# Patient Record
Sex: MALE | URBAN - METROPOLITAN AREA
[De-identification: names, ages, dates, MRNs, and addresses within clinical notes are randomized per-mention and may not be internally consistent; named-entity substitution may affect disease eponyms.]

---

## 2021-06-17 ENCOUNTER — APPOINTMENT (OUTPATIENT)
Dept: URBAN - METROPOLITAN AREA CLINIC 272 | Age: 26
Setting detail: DERMATOLOGY
End: 2021-06-18

## 2021-06-17 DIAGNOSIS — D49.2 NEOPLASM OF UNSPECIFIED BEHAVIOR OF BONE, SOFT TISSUE, AND SKIN: ICD-10-CM

## 2021-06-17 DIAGNOSIS — L81.4 OTHER MELANIN HYPERPIGMENTATION: ICD-10-CM

## 2021-06-17 DIAGNOSIS — L72.0 EPIDERMAL CYST: ICD-10-CM

## 2021-06-17 DIAGNOSIS — D22 MELANOCYTIC NEVI: ICD-10-CM

## 2021-06-17 PROBLEM — D22.4 MELANOCYTIC NEVI OF SCALP AND NECK: Status: ACTIVE | Noted: 2021-06-17

## 2021-06-17 PROCEDURE — OTHER BENIGN DESTRUCTION COSMETIC: OTHER

## 2021-06-17 PROCEDURE — 11103 TANGNTL BX SKIN EA SEP/ADDL: CPT

## 2021-06-17 PROCEDURE — 99202 OFFICE O/P NEW SF 15 MIN: CPT | Mod: 25

## 2021-06-17 PROCEDURE — OTHER BIOPSY BY SHAVE METHOD: OTHER

## 2021-06-17 PROCEDURE — OTHER REASSURANCE: OTHER

## 2021-06-17 PROCEDURE — OTHER COUNSELING: OTHER

## 2021-06-17 PROCEDURE — 11102 TANGNTL BX SKIN SINGLE LES: CPT

## 2021-06-17 ASSESSMENT — LOCATION DETAILED DESCRIPTION DERM
LOCATION DETAILED: RIGHT INFERIOR MEDIAL BUCCAL CHEEK
LOCATION DETAILED: RIGHT SUPERIOR OCCIPITAL SCALP
LOCATION DETAILED: LEFT CENTRAL MALAR CHEEK
LOCATION DETAILED: LEFT INFERIOR CENTRAL MALAR CHEEK
LOCATION DETAILED: RIGHT SUPERIOR MEDIAL BUCCAL CHEEK
LOCATION DETAILED: RIGHT CENTRAL MALAR CHEEK

## 2021-06-17 ASSESSMENT — LOCATION SIMPLE DESCRIPTION DERM
LOCATION SIMPLE: POSTERIOR SCALP
LOCATION SIMPLE: RIGHT CHEEK
LOCATION SIMPLE: LEFT CHEEK

## 2021-06-17 ASSESSMENT — LOCATION ZONE DERM
LOCATION ZONE: SCALP
LOCATION ZONE: FACE

## 2021-06-17 NOTE — PROCEDURE: BIOPSY BY SHAVE METHOD
Consent: Written consent was obtained and risks were reviewed including but not limited to scarring, infection, bleeding, scabbing, incomplete removal, nerve damage and allergy to anesthesia.
Hide Additional Size Dimension?: No
Anesthesia Type: 1% lidocaine without epinephrine
Cryotherapy Text: The wound bed was treated with cryotherapy after the biopsy was performed.
Biopsy Method: Personna blade
Depth Of Biopsy: dermis
Silver Nitrate Text: The wound bed was treated with silver nitrate after the biopsy was performed.
Notification Instructions: Patient will be notified of biopsy results. However, patient instructed to call the office if not contacted within 2 weeks.
Hemostasis: Electrocautery
Information: Selecting Yes will display possible errors in your note based on the variables you have selected. This validation is only offered as a suggestion for you. PLEASE NOTE THAT THE VALIDATION TEXT WILL BE REMOVED WHEN YOU FINALIZE YOUR NOTE. IF YOU WANT TO FAX A PRELIMINARY NOTE YOU WILL NEED TO TOGGLE THIS TO 'NO' IF YOU DO NOT WANT IT IN YOUR FAXED NOTE.
Post-Care Instructions: I reviewed with the patient in detail post-care instructions. Patient is to keep the biopsy site dry overnight, and then apply bacitracin twice daily until healed. Patient may apply hydrogen peroxide soaks to remove any crusting.
Electrodesiccation And Curettage Text: The wound bed was treated with electrodesiccation and curettage after the biopsy was performed.
Anesthesia Volume In Cc (Will Not Render If 0): 0.5
Size Of Lesion In Cm: 0.6
Dressing: bandage
Biopsy Type: H and E
Detail Level: Detailed
Curettage Text: The wound bed was treated with curettage after the biopsy was performed.
Was A Bandage Applied: Yes
Body Location Override (Optional - Billing Will Still Be Based On Selected Body Map Location If Applicable): right chin inferior
Body Location Override (Optional - Billing Will Still Be Based On Selected Body Map Location If Applicable): right chin superior
Billing Type: Third-Party Bill
Electrodesiccation Text: The wound bed was treated with electrodesiccation after the biopsy was performed.
Additional Anesthesia Volume In Cc (Will Not Render If 0): 0
Type Of Destruction Used: Curettage
Wound Care: Petrolatum
Size Of Lesion In Cm: 0.8

## 2024-04-18 ENCOUNTER — LAB (OUTPATIENT)
Dept: LAB | Facility: HOSPITAL | Age: 29
End: 2024-04-18
Payer: MEDICAID

## 2024-04-18 ENCOUNTER — OFFICE VISIT (OUTPATIENT)
Dept: FAMILY MEDICINE CLINIC | Facility: CLINIC | Age: 29
End: 2024-04-18
Payer: MEDICAID

## 2024-04-18 VITALS
SYSTOLIC BLOOD PRESSURE: 116 MMHG | DIASTOLIC BLOOD PRESSURE: 70 MMHG | HEART RATE: 89 BPM | OXYGEN SATURATION: 97 % | WEIGHT: 155.2 LBS

## 2024-04-18 DIAGNOSIS — Z87.19 HISTORY OF PANCREATITIS: ICD-10-CM

## 2024-04-18 DIAGNOSIS — F19.11 HISTORY OF SUBSTANCE ABUSE: ICD-10-CM

## 2024-04-18 DIAGNOSIS — Z00.00 HEALTHCARE MAINTENANCE: ICD-10-CM

## 2024-04-18 DIAGNOSIS — K21.9 GASTROESOPHAGEAL REFLUX DISEASE, UNSPECIFIED WHETHER ESOPHAGITIS PRESENT: Primary | ICD-10-CM

## 2024-04-18 DIAGNOSIS — F17.200 TOBACCO DEPENDENCE: ICD-10-CM

## 2024-04-18 DIAGNOSIS — R74.8 ELEVATED LIVER ENZYMES: ICD-10-CM

## 2024-04-18 DIAGNOSIS — R10.10 UPPER ABDOMINAL PAIN: ICD-10-CM

## 2024-04-18 PROBLEM — N20.0 KIDNEY STONES: Status: ACTIVE | Noted: 2024-04-18

## 2024-04-18 LAB — HBV SURFACE AG SERPL QL IA: NORMAL

## 2024-04-18 PROCEDURE — 85027 COMPLETE CBC AUTOMATED: CPT

## 2024-04-18 PROCEDURE — 80053 COMPREHEN METABOLIC PANEL: CPT

## 2024-04-18 PROCEDURE — 83690 ASSAY OF LIPASE: CPT

## 2024-04-18 PROCEDURE — 86706 HEP B SURFACE ANTIBODY: CPT

## 2024-04-18 PROCEDURE — G0432 EIA HIV-1/HIV-2 SCREEN: HCPCS

## 2024-04-18 PROCEDURE — 83036 HEMOGLOBIN GLYCOSYLATED A1C: CPT

## 2024-04-18 PROCEDURE — 36415 COLL VENOUS BLD VENIPUNCTURE: CPT

## 2024-04-18 PROCEDURE — 84443 ASSAY THYROID STIM HORMONE: CPT

## 2024-04-18 PROCEDURE — 80061 LIPID PANEL: CPT

## 2024-04-18 PROCEDURE — 86592 SYPHILIS TEST NON-TREP QUAL: CPT

## 2024-04-18 PROCEDURE — 86803 HEPATITIS C AB TEST: CPT

## 2024-04-18 PROCEDURE — 87340 HEPATITIS B SURFACE AG IA: CPT

## 2024-04-18 RX ORDER — PRAZOSIN HYDROCHLORIDE 2 MG/1
2 CAPSULE ORAL NIGHTLY
Qty: 30 CAPSULE | Refills: 5 | Status: SHIPPED | OUTPATIENT
Start: 2024-04-18

## 2024-04-18 RX ORDER — HYDROXYZINE HYDROCHLORIDE 25 MG/1
25-50 TABLET, FILM COATED ORAL NIGHTLY
Qty: 60 TABLET | Refills: 5 | Status: SHIPPED | OUTPATIENT
Start: 2024-04-18

## 2024-04-18 RX ORDER — HYDROXYZINE 50 MG/1
25-50 TABLET, FILM COATED ORAL NIGHTLY
COMMUNITY
End: 2024-04-18 | Stop reason: SDUPTHER

## 2024-04-18 RX ORDER — PRAZOSIN HYDROCHLORIDE 2 MG/1
2 CAPSULE ORAL NIGHTLY
COMMUNITY
End: 2024-04-18 | Stop reason: SDUPTHER

## 2024-04-18 RX ORDER — OMEPRAZOLE 40 MG/1
40 CAPSULE, DELAYED RELEASE ORAL DAILY
Qty: 30 CAPSULE | Refills: 5 | Status: SHIPPED | OUTPATIENT
Start: 2024-04-18

## 2024-04-18 RX ORDER — VENLAFAXINE HYDROCHLORIDE 150 MG/1
150 CAPSULE, EXTENDED RELEASE ORAL DAILY
Qty: 30 CAPSULE | Refills: 5 | Status: SHIPPED | OUTPATIENT
Start: 2024-04-18 | End: 2024-04-19

## 2024-04-18 RX ORDER — VENLAFAXINE HYDROCHLORIDE 150 MG/1
150 CAPSULE, EXTENDED RELEASE ORAL DAILY
COMMUNITY
End: 2024-04-18 | Stop reason: SDUPTHER

## 2024-04-18 NOTE — PROGRESS NOTES
Chief Complaint   Patient presents with    Med Refill     Medication review   Frequent bowel movements (may be lactose intolerant)   Wants omeprazole       HPI     Bin Andrade is a pleasant 29 y.o. male who presents for an initial visit.     Currently staying in sober living at [facility] who advised him to establish care here. 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 Sober x 7 months. Starts job on Sunday at [employer].    He reports a history of pancreatitis not associated with alcohol. He did see GI but reportedly refused surgery. He has a history of gallstones. He is trying to avoid greasy and fried foods. Diarrhea last night and this morning. Thinks this is related to mild. Believes he is lactose intolerant. Also reports GERD daily.. Was on omeprazole which helped but has been off x 5 months. He states liver enzymes were high while in recovery.     He was started on Effexor while in recovery for anxiety. Not jittery like he was. Some increased anxiety recently related to passing of family members, and he cannot be there. Previous sober living facility prescribed prazosin for nightmares which has been helpful. Taking hydroxyzine 50 mg qhs. Does have some drowsiness in the mornings. He follows with a therapist through [facility].     Past Medical History:   Diagnosis Date    ADHD (attention deficit hyperactivity disorder)     Depression     Frequent bowel movements     Gall stones     GERD (gastroesophageal reflux disease)     Kidney stones     Migraine     Pancreatitis        Past Surgical History:   Procedure Laterality Date    HERNIA MESH REMOVAL      INGUINAL HERNIA REPAIR         Family History   Problem Relation Age of Onset    Drug abuse Mother        Social History     Socioeconomic History    Marital status: Single   Tobacco Use    Smoking status: Every Day     Current packs/day: 0.50     Average packs/day: 0.5 packs/day for 19.3 years (9.6 ttl pk-yrs)      Types: Cigarettes     Start date: 2005    Smokeless tobacco: Never   Vaping Use    Vaping status: Every Day    Start date: 1/1/2018    Substances: Nicotine    Devices: Disposable   Substance and Sexual Activity    Alcohol use: Not Currently    Drug use: Not Currently     Types: Methamphetamines     Comment: 7 mo sober    Sexual activity: Yes       No Known Allergies    ROS    Review of Systems   Constitutional: Negative.  Positive for fatigue. Negative for appetite change, chills, diaphoresis, fever, unexpected weight gain and unexpected weight loss.   HENT: Negative.  Negative for ear pain, hearing loss, nosebleeds, rhinorrhea, sore throat, trouble swallowing and voice change.    Eyes: Negative.  Negative for pain, redness, itching and visual disturbance.   Respiratory: Negative.  Negative for cough, shortness of breath and wheezing.    Cardiovascular: Negative.  Positive for chest pain. Negative for palpitations and leg swelling.   Gastrointestinal: Negative.  Positive for abdominal pain, diarrhea and GERD. Negative for blood in stool, constipation, nausea and vomiting.   Endocrine: Negative.  Negative for cold intolerance and heat intolerance.   Genitourinary: Negative.  Negative for dysuria, frequency, hematuria, urgency and urinary incontinence.   Musculoskeletal: Negative.  Negative for arthralgias, gait problem, joint swelling and myalgias.   Skin:  Positive for skin lesions. Negative for color change and rash.   Allergic/Immunologic: Negative.    Neurological: Negative.  Negative for dizziness, seizures, syncope, weakness, light-headedness and numbness.        Negative for paresthesia   Hematological: Negative.  Negative for adenopathy. Does not bruise/bleed easily.   Psychiatric/Behavioral: Negative.  Positive for dysphoric mood and depressed mood. Negative for behavioral problems, sleep disturbance and suicidal ideas. The patient is nervous/anxious.        Vitals:    04/18/24 1102   BP: 116/70   Pulse: 89    SpO2: 97%     There is no height or weight on file to calculate BMI.      Current Outpatient Medications:     hydrOXYzine (ATARAX) 25 MG tablet, Take 1-2 tablets by mouth Every Night., Disp: 60 tablet, Rfl: 5    prazosin (MINIPRESS) 2 MG capsule, Take 1 capsule by mouth Every Night., Disp: 30 capsule, Rfl: 5    venlafaxine XR (EFFEXOR-XR) 150 MG 24 hr capsule, Take 1 capsule by mouth Daily., Disp: 30 capsule, Rfl: 5    omeprazole (priLOSEC) 40 MG capsule, Take 1 capsule by mouth Daily., Disp: 30 capsule, Rfl: 5    PE    Physical Exam  Vitals reviewed.   Constitutional:       General: He is not in acute distress.     Appearance: He is well-developed.   HENT:      Head: Normocephalic and atraumatic.   Eyes:      Conjunctiva/sclera: Conjunctivae normal.   Cardiovascular:      Rate and Rhythm: Normal rate and regular rhythm.      Heart sounds: Normal heart sounds. No murmur heard.  Pulmonary:      Effort: Pulmonary effort is normal.      Breath sounds: Normal breath sounds.   Abdominal:      General: Bowel sounds are normal.      Palpations: Abdomen is soft.      Tenderness: There is abdominal tenderness in the right upper quadrant and epigastric area. Negative signs include Ashraf's sign.   Musculoskeletal:      Cervical back: Normal range of motion.   Skin:     General: Skin is warm and dry.   Neurological:      Mental Status: He is alert.      Gait: Gait normal.   Psychiatric:         Speech: Speech normal.         Behavior: Behavior normal.          A/P    Problem List Items Addressed This Visit    None  Visit Diagnoses       Gastroesophageal reflux disease, unspecified whether esophagitis present    -  Primary    Relevant Medications    omeprazole (priLOSEC) 40 MG capsule    Upper abdominal pain        History of pancreatitis        Relevant Orders    Lipase    History of substance abuse        Tobacco dependence        Healthcare maintenance        Relevant Orders    CBC (No Diff)    Comprehensive Metabolic  Panel    Hemoglobin A1c    TSH Rfx On Abnormal To Free T4    Lipid Panel    Hepatitis C Antibody    HIV-1 / O / 2 Ag / Antibody    RPR    Elevated liver enzymes        Relevant Orders    Hepatitis B Surface Antigen    Hepatitis B Surface Antibody          -Refilled effexor, hydroxyzine, prozosin. Mood has been fairly stable until recent increased stressors - see HPI. No SI/HI. Continue meeting with therapist at sober living. May decrease hydroxyzine to 25 mg qhs to see if this will help with morning somnolence.   -Resume omeprazole daily. Patient will likely need follow-up with GI for pancreatitis. Unclear if this is chronic or related to gallstones. Further management pending his results and response to PPI therapy. I did encourage him to have cholecystectomy. He has declined this recommendation twice in the past.   -RTC in 6 weeks for physical, sooner prn.     Plan of care was reviewed with patient at the conclusion of today's visit. Education was provided regarding diagnoses, management, prescribed or recommended OTC products, and the importance of compliance with follow-up appointments. The patient was counseled regarding the risks, benefits, and possible side-effects of treatment. I advised the patient to keep me informed of any acute changes in their status including new, worsening, or persistent symptoms. Patient expresses understanding and agreement with the management plan.        Ignacio Bearden PA-C

## 2024-04-19 ENCOUNTER — OFFICE VISIT (OUTPATIENT)
Dept: FAMILY MEDICINE CLINIC | Facility: CLINIC | Age: 29
End: 2024-04-19
Payer: MEDICAID

## 2024-04-19 VITALS
HEART RATE: 97 BPM | SYSTOLIC BLOOD PRESSURE: 112 MMHG | DIASTOLIC BLOOD PRESSURE: 66 MMHG | WEIGHT: 157.5 LBS | OXYGEN SATURATION: 95 % | HEIGHT: 66 IN | BODY MASS INDEX: 25.31 KG/M2

## 2024-04-19 DIAGNOSIS — R74.8 ELEVATED LIVER ENZYMES: ICD-10-CM

## 2024-04-19 DIAGNOSIS — F41.9 ANXIETY: Primary | ICD-10-CM

## 2024-04-19 DIAGNOSIS — E78.2 MIXED HYPERLIPIDEMIA: ICD-10-CM

## 2024-04-19 DIAGNOSIS — R73.03 PREDIABETES: ICD-10-CM

## 2024-04-19 DIAGNOSIS — R10.10 UPPER ABDOMINAL PAIN: ICD-10-CM

## 2024-04-19 LAB
ALBUMIN SERPL-MCNC: 4.7 G/DL (ref 3.5–5.2)
ALBUMIN/GLOB SERPL: 1.6 G/DL
ALP SERPL-CCNC: 105 U/L (ref 39–117)
ALT SERPL W P-5'-P-CCNC: 126 U/L (ref 1–41)
ANION GAP SERPL CALCULATED.3IONS-SCNC: 12.3 MMOL/L (ref 5–15)
AST SERPL-CCNC: 46 U/L (ref 1–40)
BILIRUB SERPL-MCNC: 0.3 MG/DL (ref 0–1.2)
BUN SERPL-MCNC: 17 MG/DL (ref 6–20)
BUN/CREAT SERPL: 23.3 (ref 7–25)
CALCIUM SPEC-SCNC: 9.7 MG/DL (ref 8.6–10.5)
CHLORIDE SERPL-SCNC: 101 MMOL/L (ref 98–107)
CHOLEST SERPL-MCNC: 256 MG/DL (ref 0–200)
CO2 SERPL-SCNC: 23.7 MMOL/L (ref 22–29)
CREAT SERPL-MCNC: 0.73 MG/DL (ref 0.76–1.27)
DEPRECATED RDW RBC AUTO: 39.5 FL (ref 37–54)
EGFRCR SERPLBLD CKD-EPI 2021: 124.7 ML/MIN/1.73
ERYTHROCYTE [DISTWIDTH] IN BLOOD BY AUTOMATED COUNT: 13.1 % (ref 12.3–15.4)
GLOBULIN UR ELPH-MCNC: 2.9 GM/DL
GLUCOSE SERPL-MCNC: 86 MG/DL (ref 65–99)
HBA1C MFR BLD: 6.2 % (ref 4.8–5.6)
HBV SURFACE AB SER RIA-ACNC: REACTIVE
HCT VFR BLD AUTO: 48.3 % (ref 37.5–51)
HCV AB SER QL: NORMAL
HDLC SERPL-MCNC: 40 MG/DL (ref 40–60)
HGB BLD-MCNC: 16.1 G/DL (ref 13–17.7)
HIV 1+2 AB+HIV1 P24 AG SERPL QL IA: NORMAL
LDLC SERPL CALC-MCNC: 180 MG/DL (ref 0–100)
LDLC/HDLC SERPL: 4.43 {RATIO}
LIPASE SERPL-CCNC: 32 U/L (ref 13–60)
MCH RBC QN AUTO: 27.9 PG (ref 26.6–33)
MCHC RBC AUTO-ENTMCNC: 33.3 G/DL (ref 31.5–35.7)
MCV RBC AUTO: 83.6 FL (ref 79–97)
PLATELET # BLD AUTO: 367 10*3/MM3 (ref 140–450)
PMV BLD AUTO: 9 FL (ref 6–12)
POTASSIUM SERPL-SCNC: 4.4 MMOL/L (ref 3.5–5.2)
PROT SERPL-MCNC: 7.6 G/DL (ref 6–8.5)
RBC # BLD AUTO: 5.78 10*6/MM3 (ref 4.14–5.8)
RPR SER QL: NORMAL
SODIUM SERPL-SCNC: 137 MMOL/L (ref 136–145)
TRIGL SERPL-MCNC: 194 MG/DL (ref 0–150)
TSH SERPL DL<=0.05 MIU/L-ACNC: 1.61 UIU/ML (ref 0.27–4.2)
VLDLC SERPL-MCNC: 36 MG/DL (ref 5–40)
WBC NRBC COR # BLD AUTO: 9.28 10*3/MM3 (ref 3.4–10.8)

## 2024-04-19 PROCEDURE — 99214 OFFICE O/P EST MOD 30 MIN: CPT | Performed by: PHYSICIAN ASSISTANT

## 2024-04-19 RX ORDER — VENLAFAXINE HYDROCHLORIDE 150 MG/1
150 CAPSULE, EXTENDED RELEASE ORAL DAILY
Qty: 30 CAPSULE | Refills: 5 | Status: SHIPPED | OUTPATIENT
Start: 2024-04-19

## 2024-04-19 RX ORDER — VENLAFAXINE HYDROCHLORIDE 75 MG/1
75 CAPSULE, EXTENDED RELEASE ORAL DAILY
Qty: 30 CAPSULE | Refills: 5 | Status: SHIPPED | OUTPATIENT
Start: 2024-04-19

## 2024-04-19 NOTE — ASSESSMENT & PLAN NOTE
A1C 6.3. Discussed low carb diet, increasing exercise.   Patient declines prediabetes education referral at this time.

## 2024-04-19 NOTE — ASSESSMENT & PLAN NOTE
Patient reportedly has been taking effexor xr 150 mg bid which is 75 mg more than the maximum dose. I do not feel comfortable prescribing this dose, and we agree to decrease to 225 mg daily. I sent effexor xr 75 mg to his pharmacy for him to start along with his previous 150 mg qd prescription.

## 2024-04-19 NOTE — ASSESSMENT & PLAN NOTE
, AST 46. Patient denies any current alcohol use. Does have hx of upper abdominal pain, pancreatitis, and gallstones. His lipase and hepatitis serologies were normal. Recently restarted on omeprazole for dyspeptic symptoms/GERD.   Will check ultrasound of abdomen for further evaluation. MEJIA in differential. If this is diagnosis, his lifestyle improvements of prediabetes and HLD should be helpful.    RTC as previously scheduled in May.

## 2024-04-19 NOTE — ASSESSMENT & PLAN NOTE
Tchol 256, Trig 194, HDL 40, .   Patient did have some 2% milk in his coffee before lab draw.   Discussed low saturated fat/Mediterranean Diet and increasing exercise.   Plan to recheck his lipid profile on fasting measurement in about 6 months.

## 2024-04-19 NOTE — PROGRESS NOTES
Chief Complaint   Patient presents with    Medication Problem     Discuss labs (A1c)  Taking 150 effexor twice a day  Needs to be able to take medication twice a day        HPI     Bin Andrade is a 29 y.o. male who is here for follow-up of  anxiety and lab results .     The patient was seen yesterday to establish care. Presents today to discuss his medications. Effexor  mg was sent to his pharmacy, but he reports he was instructed by his previous sober living facility to take this bid.  He also has questions about his lab results performed yesterday. We reviewed his results.     Past Medical History:   Diagnosis Date    ADHD (attention deficit hyperactivity disorder)     Depression     Frequent bowel movements     Gall stones     GERD (gastroesophageal reflux disease)     Kidney stones     Migraine     Pancreatitis        Past Surgical History:   Procedure Laterality Date    HERNIA MESH REMOVAL      INGUINAL HERNIA REPAIR         Family History   Problem Relation Age of Onset    Drug abuse Mother        Social History     Socioeconomic History    Marital status: Single   Tobacco Use    Smoking status: Every Day     Current packs/day: 0.50     Average packs/day: 0.5 packs/day for 19.3 years (9.6 ttl pk-yrs)     Types: Cigarettes     Start date: 2005    Smokeless tobacco: Never   Vaping Use    Vaping status: Every Day    Start date: 1/1/2018    Substances: Nicotine    Devices: Disposable   Substance and Sexual Activity    Alcohol use: Not Currently    Drug use: Not Currently     Types: Methamphetamines     Comment: 7 mo sober    Sexual activity: Yes       No Known Allergies    ROS    Review of Systems   Psychiatric/Behavioral:  The patient is nervous/anxious.        Vitals:    04/19/24 1050   BP: 112/66   Pulse: 97   SpO2: 95%     Body mass index is 25.42 kg/m².      Current Outpatient Medications:     hydrOXYzine (ATARAX) 25 MG tablet, Take 1-2 tablets by mouth Every Night., Disp: 60 tablet, Rfl: 5     omeprazole (priLOSEC) 40 MG capsule, Take 1 capsule by mouth Daily., Disp: 30 capsule, Rfl: 5    prazosin (MINIPRESS) 2 MG capsule, Take 1 capsule by mouth Every Night., Disp: 30 capsule, Rfl: 5    venlafaxine XR (EFFEXOR-XR) 150 MG 24 hr capsule, Take 1 capsule by mouth Daily., Disp: 30 capsule, Rfl: 5    venlafaxine XR (EFFEXOR-XR) 75 MG 24 hr capsule, Take 1 capsule by mouth Daily., Disp: 30 capsule, Rfl: 5    PE    Physical Exam  Vitals reviewed.   Constitutional:       General: He is not in acute distress.  Pulmonary:      Effort: Pulmonary effort is normal. No respiratory distress.   Neurological:      Mental Status: He is alert.   Psychiatric:         Mood and Affect: Mood normal.         Results    Results for orders placed or performed in visit on 04/18/24   CBC (No Diff)    Specimen: Blood   Result Value Ref Range    WBC 9.28 3.40 - 10.80 10*3/mm3    RBC 5.78 4.14 - 5.80 10*6/mm3    Hemoglobin 16.1 13.0 - 17.7 g/dL    Hematocrit 48.3 37.5 - 51.0 %    MCV 83.6 79.0 - 97.0 fL    MCH 27.9 26.6 - 33.0 pg    MCHC 33.3 31.5 - 35.7 g/dL    RDW 13.1 12.3 - 15.4 %    RDW-SD 39.5 37.0 - 54.0 fl    MPV 9.0 6.0 - 12.0 fL    Platelets 367 140 - 450 10*3/mm3   Comprehensive Metabolic Panel    Specimen: Blood   Result Value Ref Range    Glucose 86 65 - 99 mg/dL    BUN 17 6 - 20 mg/dL    Creatinine 0.73 (L) 0.76 - 1.27 mg/dL    Sodium 137 136 - 145 mmol/L    Potassium 4.4 3.5 - 5.2 mmol/L    Chloride 101 98 - 107 mmol/L    CO2 23.7 22.0 - 29.0 mmol/L    Calcium 9.7 8.6 - 10.5 mg/dL    Total Protein 7.6 6.0 - 8.5 g/dL    Albumin 4.7 3.5 - 5.2 g/dL    ALT (SGPT) 126 (H) 1 - 41 U/L    AST (SGOT) 46 (H) 1 - 40 U/L    Alkaline Phosphatase 105 39 - 117 U/L    Total Bilirubin 0.3 0.0 - 1.2 mg/dL    Globulin 2.9 gm/dL    A/G Ratio 1.6 g/dL    BUN/Creatinine Ratio 23.3 7.0 - 25.0    Anion Gap 12.3 5.0 - 15.0 mmol/L    eGFR 124.7 >60.0 mL/min/1.73   Hemoglobin A1c    Specimen: Blood   Result Value Ref Range    Hemoglobin A1C 6.20  (H) 4.80 - 5.60 %   Lipase    Specimen: Blood   Result Value Ref Range    Lipase 32 13 - 60 U/L   TSH Rfx On Abnormal To Free T4    Specimen: Blood   Result Value Ref Range    TSH 1.610 0.270 - 4.200 uIU/mL   Lipid Panel    Specimen: Blood   Result Value Ref Range    Total Cholesterol 256 (H) 0 - 200 mg/dL    Triglycerides 194 (H) 0 - 150 mg/dL    HDL Cholesterol 40 40 - 60 mg/dL    LDL Cholesterol  180 (H) 0 - 100 mg/dL    VLDL Cholesterol 36 5 - 40 mg/dL    LDL/HDL Ratio 4.43    Hepatitis C Antibody    Specimen: Blood   Result Value Ref Range    Hepatitis C Ab Non-Reactive Non-Reactive   Hepatitis B Surface Antigen    Specimen: Blood   Result Value Ref Range    Hepatitis B Surface Ag Non-Reactive Non-Reactive   Hepatitis B Surface Antibody    Specimen: Blood   Result Value Ref Range    Hep B S Ab Reactive    HIV-1 / O / 2 Ag / Antibody    Specimen: Blood   Result Value Ref Range    HIV DUO Non-Reactive Non-Reactive   RPR    Specimen: Blood   Result Value Ref Range    RPR Non-Reactive Non-Reactive       A/P    Problem List Items Addressed This Visit          Cardiac and Vasculature    Mixed hyperlipidemia    Current Assessment & Plan     Tchol 256, Trig 194, HDL 40, .   Patient did have some 2% milk in his coffee before lab draw.   Discussed low saturated fat/Mediterranean Diet and increasing exercise.   Plan to recheck his lipid profile on fasting measurement in about 6 months.             Endocrine and Metabolic    Prediabetes    Current Assessment & Plan     A1C 6.3. Discussed low carb diet, increasing exercise.   Patient declines prediabetes education referral at this time.             Gastrointestinal Abdominal     Elevated liver enzymes    Current Assessment & Plan     , AST 46. Patient denies any current alcohol use. Does have hx of upper abdominal pain, pancreatitis, and gallstones. His lipase and hepatitis serologies were normal. Recently restarted on omeprazole for dyspeptic symptoms/GERD.    Will check ultrasound of abdomen for further evaluation. MEJIA in differential. If this is diagnosis, his lifestyle improvements of prediabetes and HLD should be helpful.    RTC as previously scheduled in May.          Relevant Orders    US Abdomen Complete       Mental Health    Anxiety - Primary    Current Assessment & Plan     Patient reportedly has been taking effexor xr 150 mg bid which is 75 mg more than the maximum dose. I do not feel comfortable prescribing this dose, and we agree to decrease to 225 mg daily. I sent effexor xr 75 mg to his pharmacy for him to start along with his previous 150 mg qd prescription.           Other Visit Diagnoses       Upper abdominal pain        Relevant Orders    US Abdomen Complete            Plan of care was reviewed with patient at the conclusion of today's visit. Education was provided regarding diagnoses, management, and the importance of keeping follow-up appointments. The patient was counseled regarding the risks, benefits, and possible side-effects of treatment. Patient and/or family express understanding and agreement with the management plan.        Ignacio Bearden PA-C

## 2024-04-24 ENCOUNTER — TELEPHONE (OUTPATIENT)
Dept: FAMILY MEDICINE CLINIC | Facility: CLINIC | Age: 29
End: 2024-04-24
Payer: MEDICAID

## 2024-04-24 DIAGNOSIS — F17.200 TOBACCO DEPENDENCE: Primary | ICD-10-CM

## 2024-04-24 NOTE — TELEPHONE ENCOUNTER
Caller: Bin Andrade    Relationship: Self    Best call back number: [phone]     Equipment requested: BLOOD PRESSURE CUFF AND FINGER READER    Reason for the request: NEED TO MONITOR HIS LEVELS    Prescribing Provider: YOVANY ARBOLEDA    Additional information or concerns: PLEASE CALL PATIENT ONCE THE REQUEST HAS BEEN SENT TO THE PHARMACY

## 2024-04-24 NOTE — TELEPHONE ENCOUNTER
I can put in orders but cannot guarantee insurance will cover supplies without a diagnosis of hypertension. He is not on any oxygen therapy either. Is he having symptoms?

## 2024-04-25 ENCOUNTER — PATIENT ROUNDING (BHMG ONLY) (OUTPATIENT)
Dept: FAMILY MEDICINE CLINIC | Facility: CLINIC | Age: 29
End: 2024-04-25
Payer: MEDICAID

## 2024-04-29 ENCOUNTER — TELEPHONE (OUTPATIENT)
Dept: FAMILY MEDICINE CLINIC | Facility: CLINIC | Age: 29
End: 2024-04-29

## 2024-04-29 NOTE — TELEPHONE ENCOUNTER
Caller: Bin Andrade    Relationship: Self    Best call back number: [phone]    Requested Prescriptions:   Requested Prescriptions      No prescriptions requested or ordered in this encounter      MIRTAZAPINE 7.5 MG (NOT ON LIST)    Pharmacy where request should be sent:      [facility]  [place], [place] - [address] - [phone]  - [phone] FX      Last office visit with prescribing clinician: 4/19/2024   Last telemedicine visit with prescribing clinician: Visit date not found   Next office visit with prescribing clinician: 5/30/2024     Does the patient have less than a 3 day supply:  [x] Yes  [] No    Would you like a call back once the refill request has been completed: [] Yes [x] No    If the office needs to give you a call back, can they leave a voicemail: [] Yes [x] No    Renuka Guerra, PCT   04/29/24 08:15 EDT

## 2024-06-03 ENCOUNTER — OFFICE VISIT (OUTPATIENT)
Dept: FAMILY MEDICINE CLINIC | Facility: CLINIC | Age: 29
End: 2024-06-03
Payer: MEDICAID

## 2024-06-03 VITALS
HEART RATE: 93 BPM | HEIGHT: 66 IN | BODY MASS INDEX: 24.53 KG/M2 | WEIGHT: 152.6 LBS | SYSTOLIC BLOOD PRESSURE: 130 MMHG | OXYGEN SATURATION: 98 % | DIASTOLIC BLOOD PRESSURE: 80 MMHG

## 2024-06-03 DIAGNOSIS — F41.9 ANXIETY: Primary | ICD-10-CM

## 2024-06-03 DIAGNOSIS — R74.8 ELEVATED LIVER ENZYMES: ICD-10-CM

## 2024-06-03 DIAGNOSIS — R73.03 PREDIABETES: ICD-10-CM

## 2024-06-03 PROCEDURE — 99213 OFFICE O/P EST LOW 20 MIN: CPT | Performed by: PHYSICIAN ASSISTANT

## 2024-06-03 RX ORDER — MIRTAZAPINE 7.5 MG/1
TABLET, FILM COATED ORAL
COMMUNITY
Start: 2024-05-16

## 2024-06-03 NOTE — PROGRESS NOTES
Chief Complaint   Patient presents with    Heartburn     6 week f/u  Was supposed to be a physical   Did not do ultrasound because was in [facility]   Would like to have another blood test        HPI     Jim Andrade is a 29 y.o. male who is here for follow-up of substance abuse, prediabetes, anxiety  and elevated liver enzymes.     Patient was actually scheduled for a physical last week but cancelled the appointment. He was rescheduled for follow-up today. He did not complete his liver ultrasound in [place], [place]. He states he could not keep the appointment due to not having transportation while he was in sober living. He is now in transitional housing. Still going to regular meetings.     His anxiety is much better. He is just taking effexor 150 mg daily.     Past Medical History:   Diagnosis Date    ADHD (attention deficit hyperactivity disorder)     Depression     Frequent bowel movements     Gall stones     GERD (gastroesophageal reflux disease)     Kidney stones     Migraine     Pancreatitis        Past Surgical History:   Procedure Laterality Date    HERNIA MESH REMOVAL      INGUINAL HERNIA REPAIR         Family History   Problem Relation Age of Onset    Drug abuse Mother        Social History     Socioeconomic History    Marital status: Single   Tobacco Use    Smoking status: Every Day     Current packs/day: 0.50     Average packs/day: 0.5 packs/day for 19.4 years (9.7 ttl pk-yrs)     Types: Cigarettes     Start date: 2005    Smokeless tobacco: Never   Vaping Use    Vaping status: Every Day    Start date: 1/1/2018    Substances: Nicotine    Devices: Disposable   Substance and Sexual Activity    Alcohol use: Not Currently    Drug use: Not Currently     Types: Methamphetamines     Comment: 7 mo sober    Sexual activity: Yes       No Known Allergies    ROS    Review of Systems   Gastrointestinal:  Negative for abdominal pain.   Psychiatric/Behavioral:  The patient is not nervous/anxious.         Vitals:    06/03/24 1122   BP: 130/80   Pulse: 93   SpO2: 98%     Body mass index is 24.64 kg/m².      Current Outpatient Medications:     Blood Pressure Monitoring (Sphygmomanometer) misc, Use to monitor blood pressure once daily as directed., Disp: 1 each, Rfl: 0    hydrOXYzine (ATARAX) 25 MG tablet, Take 1-2 tablets by mouth Every Night., Disp: 60 tablet, Rfl: 5    mirtazapine (REMERON) 7.5 MG tablet, , Disp: , Rfl:     omeprazole (priLOSEC) 40 MG capsule, Take 1 capsule by mouth Daily., Disp: 30 capsule, Rfl: 5    prazosin (MINIPRESS) 2 MG capsule, Take 1 capsule by mouth Every Night., Disp: 30 capsule, Rfl: 5    venlafaxine XR (EFFEXOR-XR) 150 MG 24 hr capsule, Take 1 capsule by mouth Daily., Disp: 30 capsule, Rfl: 5    PE    Physical Exam  Vitals reviewed.   Constitutional:       General: He is not in acute distress.  Pulmonary:      Effort: Pulmonary effort is normal. No respiratory distress.   Neurological:      Mental Status: He is alert.   Psychiatric:         Mood and Affect: Mood normal.         Results    Results for orders placed or performed in visit on 04/18/24   CBC (No Diff)    Specimen: Blood   Result Value Ref Range    WBC 9.28 3.40 - 10.80 10*3/mm3    RBC 5.78 4.14 - 5.80 10*6/mm3    Hemoglobin 16.1 13.0 - 17.7 g/dL    Hematocrit 48.3 37.5 - 51.0 %    MCV 83.6 79.0 - 97.0 fL    MCH 27.9 26.6 - 33.0 pg    MCHC 33.3 31.5 - 35.7 g/dL    RDW 13.1 12.3 - 15.4 %    RDW-SD 39.5 37.0 - 54.0 fl    MPV 9.0 6.0 - 12.0 fL    Platelets 367 140 - 450 10*3/mm3   Comprehensive Metabolic Panel    Specimen: Blood   Result Value Ref Range    Glucose 86 65 - 99 mg/dL    BUN 17 6 - 20 mg/dL    Creatinine 0.73 (L) 0.76 - 1.27 mg/dL    Sodium 137 136 - 145 mmol/L    Potassium 4.4 3.5 - 5.2 mmol/L    Chloride 101 98 - 107 mmol/L    CO2 23.7 22.0 - 29.0 mmol/L    Calcium 9.7 8.6 - 10.5 mg/dL    Total Protein 7.6 6.0 - 8.5 g/dL    Albumin 4.7 3.5 - 5.2 g/dL    ALT (SGPT) 126 (H) 1 - 41 U/L    AST (SGOT) 46 (H) 1 -  40 U/L    Alkaline Phosphatase 105 39 - 117 U/L    Total Bilirubin 0.3 0.0 - 1.2 mg/dL    Globulin 2.9 gm/dL    A/G Ratio 1.6 g/dL    BUN/Creatinine Ratio 23.3 7.0 - 25.0    Anion Gap 12.3 5.0 - 15.0 mmol/L    eGFR 124.7 >60.0 mL/min/1.73   Hemoglobin A1c    Specimen: Blood   Result Value Ref Range    Hemoglobin A1C 6.20 (H) 4.80 - 5.60 %   Lipase    Specimen: Blood   Result Value Ref Range    Lipase 32 13 - 60 U/L   TSH Rfx On Abnormal To Free T4    Specimen: Blood   Result Value Ref Range    TSH 1.610 0.270 - 4.200 uIU/mL   Lipid Panel    Specimen: Blood   Result Value Ref Range    Total Cholesterol 256 (H) 0 - 200 mg/dL    Triglycerides 194 (H) 0 - 150 mg/dL    HDL Cholesterol 40 40 - 60 mg/dL    LDL Cholesterol  180 (H) 0 - 100 mg/dL    VLDL Cholesterol 36 5 - 40 mg/dL    LDL/HDL Ratio 4.43    Hepatitis C Antibody    Specimen: Blood   Result Value Ref Range    Hepatitis C Ab Non-Reactive Non-Reactive   Hepatitis B Surface Antigen    Specimen: Blood   Result Value Ref Range    Hepatitis B Surface Ag Non-Reactive Non-Reactive   Hepatitis B Surface Antibody    Specimen: Blood   Result Value Ref Range    Hep B S Ab Reactive    HIV-1 / O / 2 Ag / Antibody    Specimen: Blood   Result Value Ref Range    HIV DUO Non-Reactive Non-Reactive   RPR    Specimen: Blood   Result Value Ref Range    RPR Non-Reactive Non-Reactive       A/P    Problem List Items Addressed This Visit          Gastrointestinal Abdominal     Elevated liver enzymes       Mental Health    Anxiety - Primary       -Plan to check LFTs, A1C next visit. He was given contact information for central scheduling and encouraged to reschedule his liver ultrasound.   -Continue effexor.   -RTC in 1 month for physical, sooner prn.       Plan of care was reviewed with patient at the conclusion of today's visit. Education was provided regarding diagnoses, management, and the importance of keeping follow-up appointments. The patient was counseled regarding the risks,  benefits, and possible side-effects of treatment. Patient and/or family express understanding and agreement with the management plan.        Ignacio Bearden PA-C

## 2024-06-05 RX ORDER — VENLAFAXINE HYDROCHLORIDE 150 MG/1
150 CAPSULE, EXTENDED RELEASE ORAL DAILY
Qty: 30 CAPSULE | Refills: 5 | OUTPATIENT
Start: 2024-06-05

## 2024-06-05 RX ORDER — OMEPRAZOLE 40 MG/1
40 CAPSULE, DELAYED RELEASE ORAL DAILY
Qty: 30 CAPSULE | Refills: 5 | OUTPATIENT
Start: 2024-06-05

## 2024-06-05 RX ORDER — PRAZOSIN HYDROCHLORIDE 2 MG/1
2 CAPSULE ORAL NIGHTLY
Qty: 30 CAPSULE | Refills: 5 | OUTPATIENT
Start: 2024-06-05

## 2024-06-05 RX ORDER — HYDROXYZINE HYDROCHLORIDE 25 MG/1
25-50 TABLET, FILM COATED ORAL NIGHTLY
Qty: 60 TABLET | Refills: 5 | OUTPATIENT
Start: 2024-06-05

## 2024-06-05 RX ORDER — VENLAFAXINE HYDROCHLORIDE 75 MG/1
75 CAPSULE, EXTENDED RELEASE ORAL DAILY
Qty: 30 CAPSULE | Refills: 5 | OUTPATIENT
Start: 2024-06-05

## 2024-06-05 NOTE — TELEPHONE ENCOUNTER
Rx Refill Note  Requested Prescriptions     Pending Prescriptions Disp Refills    omeprazole (priLOSEC) 40 MG capsule 30 capsule 5     Sig: Take 1 capsule by mouth Daily.    hydrOXYzine (ATARAX) 25 MG tablet 60 tablet 5     Sig: Take 1-2 tablets by mouth Every Night.    prazosin (MINIPRESS) 2 MG capsule 30 capsule 5     Sig: Take 1 capsule by mouth Every Night.    venlafaxine XR (EFFEXOR-XR) 150 MG 24 hr capsule 30 capsule 5     Sig: Take 1 capsule by mouth Daily.    venlafaxine XR (EFFEXOR-XR) 75 MG 24 hr capsule 30 capsule 5     Sig: Take 1 capsule by mouth Daily.      Last office visit with prescribing clinician: 4/19/2024   Last telemedicine visit with prescribing clinician: Visit date not found   Next office visit with prescribing clinician: 6/3/2024   {    Cielo Ham MA  06/05/24, 10:14 EDT

## 2024-06-13 ENCOUNTER — HOSPITAL ENCOUNTER (EMERGENCY)
Facility: HOSPITAL | Age: 29
Discharge: HOME | End: 2024-06-13
Attending: EMERGENCY MEDICINE
Payer: MEDICAID

## 2024-06-13 VITALS
RESPIRATION RATE: 16 BRPM | DIASTOLIC BLOOD PRESSURE: 65 MMHG | SYSTOLIC BLOOD PRESSURE: 105 MMHG | TEMPERATURE: 98.3 F | OXYGEN SATURATION: 96 % | HEART RATE: 75 BPM

## 2024-06-13 DIAGNOSIS — R42 LIGHTHEADED: Primary | ICD-10-CM

## 2024-06-13 LAB
ALBUMIN SERPL-MCNC: 4 G/DL (ref 3.5–5.2)
ALBUMIN/GLOB SERPL: 1.4 G/DL
ALP SERPL-CCNC: 96 U/L (ref 39–117)
ALT SERPL W P-5'-P-CCNC: 41 U/L (ref 1–41)
ANION GAP SERPL CALCULATED.3IONS-SCNC: 8 MMOL/L (ref 5–15)
AST SERPL-CCNC: 27 U/L (ref 1–40)
BASOPHILS # BLD AUTO: 0.04 10*3/MM3 (ref 0–0.2)
BASOPHILS NFR BLD AUTO: 0.4 % (ref 0–1.5)
BILIRUB SERPL-MCNC: 0.3 MG/DL (ref 0–1.2)
BUN SERPL-MCNC: 16 MG/DL (ref 6–20)
BUN/CREAT SERPL: 20 (ref 7–25)
CALCIUM SPEC-SCNC: 9.3 MG/DL (ref 8.6–10.5)
CHLORIDE SERPL-SCNC: 102 MMOL/L (ref 98–107)
CO2 SERPL-SCNC: 27 MMOL/L (ref 22–29)
CREAT SERPL-MCNC: 0.8 MG/DL (ref 0.76–1.27)
DEPRECATED RDW RBC AUTO: 39.7 FL (ref 37–54)
EGFRCR SERPLBLD CKD-EPI 2021: 121.3 ML/MIN/1.73
EOSINOPHIL # BLD AUTO: 0.24 10*3/MM3 (ref 0–0.4)
EOSINOPHIL NFR BLD AUTO: 2.7 % (ref 0.3–6.2)
ERYTHROCYTE [DISTWIDTH] IN BLOOD BY AUTOMATED COUNT: 12.5 % (ref 12.3–15.4)
GLOBULIN UR ELPH-MCNC: 2.9 GM/DL
GLUCOSE SERPL-MCNC: 103 MG/DL (ref 65–99)
HCT VFR BLD AUTO: 46.5 % (ref 37.5–51)
HGB BLD-MCNC: 15.4 G/DL (ref 13–17.7)
IMM GRANULOCYTES # BLD AUTO: 0.02 10*3/MM3 (ref 0–0.05)
IMM GRANULOCYTES NFR BLD AUTO: 0.2 % (ref 0–0.5)
LYMPHOCYTES # BLD AUTO: 2.94 10*3/MM3 (ref 0.7–3.1)
LYMPHOCYTES NFR BLD AUTO: 33 % (ref 19.6–45.3)
MCH RBC QN AUTO: 28.8 PG (ref 26.6–33)
MCHC RBC AUTO-ENTMCNC: 33.1 G/DL (ref 31.5–35.7)
MCV RBC AUTO: 87.1 FL (ref 79–97)
MONOCYTES # BLD AUTO: 0.84 10*3/MM3 (ref 0.1–0.9)
MONOCYTES NFR BLD AUTO: 9.4 % (ref 5–12)
NEUTROPHILS NFR BLD AUTO: 4.83 10*3/MM3 (ref 1.7–7)
NEUTROPHILS NFR BLD AUTO: 54.3 % (ref 42.7–76)
NRBC BLD AUTO-RTO: 0 /100 WBC (ref 0–0.2)
PLATELET # BLD AUTO: 318 10*3/MM3 (ref 140–450)
PMV BLD AUTO: 8.9 FL (ref 6–12)
POTASSIUM SERPL-SCNC: 3.8 MMOL/L (ref 3.5–5.2)
PROT SERPL-MCNC: 6.9 G/DL (ref 6–8.5)
QT INTERVAL: 392 MS
QTC INTERVAL: 394 MS
RBC # BLD AUTO: 5.34 10*6/MM3 (ref 4.14–5.8)
SODIUM SERPL-SCNC: 137 MMOL/L (ref 136–145)
WBC NRBC COR # BLD AUTO: 8.91 10*3/MM3 (ref 3.4–10.8)

## 2024-06-13 PROCEDURE — 85025 COMPLETE CBC W/AUTO DIFF WBC: CPT | Performed by: EMERGENCY MEDICINE

## 2024-06-13 PROCEDURE — 99283 EMERGENCY DEPT VISIT LOW MDM: CPT

## 2024-06-13 PROCEDURE — 25810000003 LACTATED RINGERS SOLUTION: Performed by: EMERGENCY MEDICINE

## 2024-06-13 PROCEDURE — 80053 COMPREHEN METABOLIC PANEL: CPT | Performed by: EMERGENCY MEDICINE

## 2024-06-13 PROCEDURE — 93005 ELECTROCARDIOGRAM TRACING: CPT | Performed by: EMERGENCY MEDICINE

## 2024-06-13 RX ORDER — VENLAFAXINE HYDROCHLORIDE 75 MG/1
150 CAPSULE, EXTENDED RELEASE ORAL
Status: DISCONTINUED | OUTPATIENT
Start: 2024-06-13 | End: 2024-06-13 | Stop reason: HOSPADM

## 2024-06-13 RX ADMIN — SODIUM CHLORIDE, POTASSIUM CHLORIDE, SODIUM LACTATE AND CALCIUM CHLORIDE 1000 ML: 600; 310; 30; 20 INJECTION, SOLUTION INTRAVENOUS at 03:40

## 2024-06-13 RX ADMIN — VENLAFAXINE HYDROCHLORIDE 150 MG: 75 CAPSULE, EXTENDED RELEASE ORAL at 04:31

## 2024-06-13 NOTE — DISCHARGE INSTRUCTIONS
Drink plenty of fluids and stay well-hydrated.  Take your medications as prescribed.  Follow-up with your primary doctor.  Return to the ER as needed for new or worsening symptoms

## 2024-06-13 NOTE — ED PROVIDER NOTES
Subjective   History of Present Illness  Patient presents for evaluation of dizziness described as lightheadedness that is been present throughout the day for the past 24 hours or so.  Is worse when he gets up and moves around and improved but not completely resolved when he sits and rests.  He states he is having some increased anxiety from his baseline as he missed his last couple doses of his home anxiety medication.  He is not having any pain.  Specifically no headache or chest pain.  No palpitations.    History provided by:  Patient      Review of Systems    Past Medical History:   Diagnosis Date    ADHD (attention deficit hyperactivity disorder)     Depression     Frequent bowel movements     Gall stones     GERD (gastroesophageal reflux disease)     Kidney stones     Migraine     Pancreatitis        No Known Allergies    Past Surgical History:   Procedure Laterality Date    HERNIA MESH REMOVAL      INGUINAL HERNIA REPAIR         Family History   Problem Relation Age of Onset    Drug abuse Mother        Social History     Socioeconomic History    Marital status: Single   Tobacco Use    Smoking status: Every Day     Current packs/day: 0.50     Average packs/day: 0.5 packs/day for 19.4 years (9.7 ttl pk-yrs)     Types: Cigarettes     Start date: 2005    Smokeless tobacco: Never   Vaping Use    Vaping status: Every Day    Start date: 1/1/2018    Substances: Nicotine    Devices: Disposable   Substance and Sexual Activity    Alcohol use: Not Currently    Drug use: Not Currently     Types: Methamphetamines     Comment: 7 mo sober    Sexual activity: Yes           Objective   Physical Exam  Constitutional:       General: He is not in acute distress.  HENT:      Head: Normocephalic and atraumatic.   Eyes:      Conjunctiva/sclera: Conjunctivae normal.      Pupils: Pupils are equal, round, and reactive to light.   Cardiovascular:      Rate and Rhythm: Normal rate and regular rhythm.      Pulses: Normal pulses.       Heart sounds: No murmur heard.     No gallop.   Pulmonary:      Effort: Pulmonary effort is normal. No respiratory distress.   Abdominal:      General: Abdomen is flat. There is no distension.      Tenderness: There is no abdominal tenderness.   Musculoskeletal:         General: No swelling or deformity. Normal range of motion.   Skin:     General: Skin is warm and dry.      Capillary Refill: Capillary refill takes less than 2 seconds.   Neurological:      General: No focal deficit present.      Mental Status: He is alert and oriented to person, place, and time.      Comments: GCS 15.  Cranial Nerves II-XII intact without deficit.  Strength 5/5 in the bilateral upper extremities.  Strength 5/5 in the bilateral lower extremities.  Sensation to light touch intact throughout.  Cerebellar function intact via finger-nose-finger.     Psychiatric:         Mood and Affect: Mood normal.         Behavior: Behavior normal.         Procedures           ED Course  ED Course as of 06/13/24 0543   Thu Jun 13, 2024   0338 Twelve-lead ECG independently interpreted by myself demonstrates normal sinus rhythm, no ST segment elevation or depression [KB]   0409 Laboratory workup independently interpreted by myself demonstrates no significant abnormalities [KB]      ED Course User Index  [KB] Jeremias Cunningham MD                                             Medical Decision Making  Broad differential diagnosis for lightheadedness including dehydration, orthostasis, acute ischemic stroke, peripheral vertigo, infectious etiology.  Patient's history and examination does not lend itself to any particular cause.  He does not have any neurologic deficits to raise concern for stroke.  Basic labs were sent and patient was given IV fluids and reassessed.    On reassessment patient states his symptoms have resolved and he is feeling much better after IV fluids.  He is appropriate for outpatient management at this time and was discharged in good  condition, counseled on hydration.    Problems Addressed:  Lightheaded: complicated acute illness or injury    Amount and/or Complexity of Data Reviewed  External Data Reviewed: notes.     Details: 6/3/2024 reviewed most recent primary care visit documenting patient's chronic medical conditions and treatment plan  Labs: ordered. Decision-making details documented in ED Course.  ECG/medicine tests: ordered and independent interpretation performed. Decision-making details documented in ED Course.    Risk  OTC drugs.  Prescription drug management.  Decision regarding hospitalization.        Final diagnoses:   Lightheaded       ED Disposition  ED Disposition       ED Disposition   Discharge    Condition   Stable    Comment   --             Recent Results (from the past 24 hour(s))   Comprehensive Metabolic Panel    Collection Time: 06/13/24  3:33 AM    Specimen: Blood   Result Value Ref Range    Glucose 103 (H) 65 - 99 mg/dL    BUN 16 6 - 20 mg/dL    Creatinine 0.80 0.76 - 1.27 mg/dL    Sodium 137 136 - 145 mmol/L    Potassium 3.8 3.5 - 5.2 mmol/L    Chloride 102 98 - 107 mmol/L    CO2 27.0 22.0 - 29.0 mmol/L    Calcium 9.3 8.6 - 10.5 mg/dL    Total Protein 6.9 6.0 - 8.5 g/dL    Albumin 4.0 3.5 - 5.2 g/dL    ALT (SGPT) 41 1 - 41 U/L    AST (SGOT) 27 1 - 40 U/L    Alkaline Phosphatase 96 39 - 117 U/L    Total Bilirubin 0.3 0.0 - 1.2 mg/dL    Globulin 2.9 gm/dL    A/G Ratio 1.4 g/dL    BUN/Creatinine Ratio 20.0 7.0 - 25.0    Anion Gap 8.0 5.0 - 15.0 mmol/L    eGFR 121.3 >60.0 mL/min/1.73   CBC Auto Differential    Collection Time: 06/13/24  3:33 AM    Specimen: Blood   Result Value Ref Range    WBC 8.91 3.40 - 10.80 10*3/mm3    RBC 5.34 4.14 - 5.80 10*6/mm3    Hemoglobin 15.4 13.0 - 17.7 g/dL    Hematocrit 46.5 37.5 - 51.0 %    MCV 87.1 79.0 - 97.0 fL    MCH 28.8 26.6 - 33.0 pg    MCHC 33.1 31.5 - 35.7 g/dL    RDW 12.5 12.3 - 15.4 %    RDW-SD 39.7 37.0 - 54.0 fl    MPV 8.9 6.0 - 12.0 fL    Platelets 318 140 - 450 10*3/mm3     Neutrophil % 54.3 42.7 - 76.0 %    Lymphocyte % 33.0 19.6 - 45.3 %    Monocyte % 9.4 5.0 - 12.0 %    Eosinophil % 2.7 0.3 - 6.2 %    Basophil % 0.4 0.0 - 1.5 %    Immature Grans % 0.2 0.0 - 0.5 %    Neutrophils, Absolute 4.83 1.70 - 7.00 10*3/mm3    Lymphocytes, Absolute 2.94 0.70 - 3.10 10*3/mm3    Monocytes, Absolute 0.84 0.10 - 0.90 10*3/mm3    Eosinophils, Absolute 0.24 0.00 - 0.40 10*3/mm3    Basophils, Absolute 0.04 0.00 - 0.20 10*3/mm3    Immature Grans, Absolute 0.02 0.00 - 0.05 10*3/mm3    nRBC 0.0 0.0 - 0.2 /100 WBC   ECG 12 Lead Other; dizzy    Collection Time: 06/13/24  3:36 AM   Result Value Ref Range    QT Interval 392 ms    QTC Interval 394 ms     Note: In addition to lab results from this visit, the labs listed above may include labs taken at another facility or during a different encounter within the last 24 hours. Please correlate lab times with ED admission and discharge times for further clarification of the services performed during this visit.    No orders to display     Vitals:    06/13/24 0226 06/13/24 0234 06/13/24 0326 06/13/24 0428   BP: 112/75  117/89 105/65   Pulse: 83  71 75   Resp: 20  17 16   Temp:  98.3 °F (36.8 °C)     TempSrc:  Oral     SpO2: 99%  95% 96%     Medications   venlafaxine XR (EFFEXOR-XR) 24 hr capsule 150 mg (150 mg Oral Given 6/13/24 0431)   lactated ringers bolus 1,000 mL (0 mL Intravenous Stopped 6/13/24 0428)     ECG/EMG Results (last 24 hours)       Procedure Component Value Units Date/Time    ECG 12 Lead Other; dizzy [[id number]] Collected: 06/13/24 0336     Updated: 06/13/24 0337     QT Interval 392 ms      QTC Interval 394 ms     Narrative:      Test Reason : Other~  Blood Pressure :   */*   mmHG  Vent. Rate :  61 BPM     Atrial Rate :  61 BPM     P-R Int : 132 ms          QRS Dur :  78 ms      QT Int : 392 ms       P-R-T Axes :  21   4  16 degrees     QTc Int : 394 ms    Normal sinus rhythm  Normal ECG  No previous ECGs available    Referred By: NAVEED            Confirmed By:           ECG 12 Lead Other; dizzy   Preliminary Result   Test Reason : Other~   Blood Pressure :   */*   mmHG   Vent. Rate :  61 BPM     Atrial Rate :  61 BPM      P-R Int : 132 ms          QRS Dur :  78 ms       QT Int : 392 ms       P-R-T Axes :  21   4  16 degrees      QTc Int : 394 ms      Normal sinus rhythm   Normal ECG   No previous ECGs available      Referred By: EDMD           Confirmed By:               No follow-up provider specified.       Medication List      No changes were made to your prescriptions during this visit.            Jeremias Cunningham MD  06/13/24 0518

## 2024-06-13 NOTE — Clinical Note
[facility] EMERGENCY DEPARTMENT  [address]  [facility] [address]  Phone: [phone]    Jim Andrade was seen and treated in our emergency department on 6/13/2024.  He may return to work on 06/14/2024.         Thank you for choosing [facility].    Jeremias Cunningham MD

## 2024-06-18 LAB
QT INTERVAL: 392 MS
QTC INTERVAL: 394 MS

## 2024-07-08 ENCOUNTER — OFFICE VISIT (OUTPATIENT)
Dept: FAMILY MEDICINE CLINIC | Facility: CLINIC | Age: 29
End: 2024-07-08
Payer: MEDICAID

## 2024-07-08 VITALS
SYSTOLIC BLOOD PRESSURE: 138 MMHG | WEIGHT: 146 LBS | HEART RATE: 102 BPM | DIASTOLIC BLOOD PRESSURE: 82 MMHG | OXYGEN SATURATION: 97 % | HEIGHT: 66 IN | BODY MASS INDEX: 23.46 KG/M2

## 2024-07-08 DIAGNOSIS — M79.605 LEFT LEG PAIN: ICD-10-CM

## 2024-07-08 DIAGNOSIS — F41.9 ANXIETY: Primary | ICD-10-CM

## 2024-07-08 PROCEDURE — 99213 OFFICE O/P EST LOW 20 MIN: CPT | Performed by: STUDENT IN AN ORGANIZED HEALTH CARE EDUCATION/TRAINING PROGRAM

## 2024-07-08 NOTE — LETTER
July 8, 2024     Patient: Jim Andrade   YOB: 1995   Date of Visit: 7/8/2024       To Whom It May Concern:    It is my medical opinion that Jim Andarde may return to work tomorrow, 7/9/24. He was seen in our office for a visit on 07/08/2024. Please excuse him for the dates 7/7/24 and 7/8/24.          Sincerely,        Lottie Carty,     CC: No Recipients

## 2024-07-08 NOTE — PROGRESS NOTES
Chief Complaint   Patient presents with    Leg Pain     Left legs notice that it only happen when pt stops taking Effexor        HPI:  Jim Andrade is a 29 y.o. male who presents today for leg pain/spasm.    Pt reports recent onset of left flank/back pain.  Notices when he stops taking his Effexor.  Tried stopping the medication over the last couple of days but developed leg symptoms as well as severe fatigue, headache and mood changes.  Felt so bad yesterday that he couldn't go into work. Has had similar symptoms in the past when he stopped taking his medication.  Took a dose this morning and already feeling better.  Patient reports he has been on the Effexor for the last 4 to 5 months but would like to wean off of it now.  Feels like it causes some numbness although admits  that it does help with his mood symptoms.       PE:  Vitals:    07/08/24 1414   BP: 138/82   Pulse: 102   SpO2: 97%      Body mass index is 23.58 kg/m².    Gen Appearance: NAD  HEENT: Normocephalic, EOMI  Heart: RRR, normal S1 and S2, no murmur  Lungs: CTA b/l, no wheezing, no crackles  MSK: Moves all extremities well, normal gait, no peripheral edema  Neuro: No focal deficits    Current Outpatient Medications   Medication Sig Dispense Refill    Blood Pressure Monitoring (Sphygmomanometer) misc Use to monitor blood pressure once daily as directed. 1 each 0    hydrOXYzine (ATARAX) 25 MG tablet Take 1-2 tablets by mouth Every Night. 60 tablet 5    mirtazapine (REMERON) 7.5 MG tablet       omeprazole (priLOSEC) 40 MG capsule Take 1 capsule by mouth Daily. 30 capsule 5    prazosin (MINIPRESS) 2 MG capsule Take 1 capsule by mouth Every Night. 30 capsule 5    venlafaxine XR (EFFEXOR-XR) 150 MG 24 hr capsule Take 1 capsule by mouth Daily. 30 capsule 5     No current facility-administered medications for this visit.        A/P:  Diagnoses and all orders for this visit:    1. Anxiety (Primary)    2. Left leg pain       New onset left leg pain,  spasm that seem to be triggered by abrupt cessation of Effexor. Pt has been taking 150mg, likely that abrupt DC of the medication at this dose would cause a withdrawal syndrome. Advised that these medications are best to taper off of rather than abrupt discontinuation. Would recommend decreasing back to 75mg daily for 2-4 weeks, then decreasing further to avoid withdrawal effects. Has appt w PCP on 7/12 and would like to keep this. He has 75mg capsules at home that he can start in the meantime. Work excuse was given for 7/7 and 7/8 per request.      Return if symptoms worsen or fail to improve.     Dictated Utilizing Dragon Dictation    Please note that portions of this note were completed with a voice recognition program.    Part of this note may be an electronic transcription/translation of spoken language to printed text using the Dragon Dictation System.

## 2024-07-15 ENCOUNTER — OFFICE VISIT (OUTPATIENT)
Dept: FAMILY MEDICINE CLINIC | Facility: CLINIC | Age: 29
End: 2024-07-15
Payer: MEDICAID

## 2024-07-15 VITALS
WEIGHT: 145 LBS | OXYGEN SATURATION: 97 % | SYSTOLIC BLOOD PRESSURE: 108 MMHG | BODY MASS INDEX: 23.3 KG/M2 | DIASTOLIC BLOOD PRESSURE: 60 MMHG | HEART RATE: 85 BPM | HEIGHT: 66 IN

## 2024-07-15 DIAGNOSIS — E78.2 MIXED HYPERLIPIDEMIA: ICD-10-CM

## 2024-07-15 DIAGNOSIS — R73.03 PREDIABETES: ICD-10-CM

## 2024-07-15 DIAGNOSIS — Z00.00 HEALTHCARE MAINTENANCE: Primary | ICD-10-CM

## 2024-07-15 DIAGNOSIS — F41.9 ANXIETY: ICD-10-CM

## 2024-07-15 DIAGNOSIS — Z91.89 AT RISK FOR SEXUALLY TRANSMITTED DISEASE DUE TO UNPROTECTED SEX: ICD-10-CM

## 2024-07-15 PROCEDURE — 90715 TDAP VACCINE 7 YRS/> IM: CPT | Performed by: PHYSICIAN ASSISTANT

## 2024-07-15 PROCEDURE — 90471 IMMUNIZATION ADMIN: CPT | Performed by: PHYSICIAN ASSISTANT

## 2024-07-15 PROCEDURE — 2014F MENTAL STATUS ASSESS: CPT | Performed by: PHYSICIAN ASSISTANT

## 2024-07-15 PROCEDURE — 99395 PREV VISIT EST AGE 18-39: CPT | Performed by: PHYSICIAN ASSISTANT

## 2024-07-15 RX ORDER — VENLAFAXINE HYDROCHLORIDE 75 MG/1
75 CAPSULE, EXTENDED RELEASE ORAL DAILY
Qty: 30 CAPSULE | Refills: 2 | Status: SHIPPED | OUTPATIENT
Start: 2024-07-15

## 2024-07-15 NOTE — PROGRESS NOTES
Reason for visit    Jim Andrade is a 29 y.o. male who presents for annual comprehensive exam.    Chief Complaint   Patient presents with    Annual Exam     And all blood work       HPI     Here for physical.   Works in food industry.   He wants to get off anxiety medication. Wants to learn to cope without them. Was seeing therapist but has not for the past month.     Diet/Physical activity:  -Walks to transit center to the house and to and from the bus daily.   -He reports eating whatever he can find. Insurance helps with box meals.     Immunizations:  -Had mokeypox vaccine on 6/29 at [employer] event.   -He thinks tetanus vaccine is overdue.   -Declines additional COVID vaccines.     Cancer screening:   -Fhx is not known due to adoption.     PHQ-9 Depression Screening  Little interest or pleasure in doing things? 0-->not at all   Feeling down, depressed, or hopeless? 0-->not at all   Trouble falling or staying asleep, or sleeping too much?     Feeling tired or having little energy?     Poor appetite or overeating?     Feeling bad about yourself - or that you are a failure or have let yourself or your family down?     Trouble concentrating on things, such as reading the newspaper or watching television?     Moving or speaking so slowly that other people could have noticed? Or the opposite - being so fidgety or restless that you have been moving around a lot more than usual?     Thoughts that you would be better off dead, or of hurting yourself in some way?     PHQ-9 Total Score 0   If you checked off any problems, how difficult have these problems made it for you to do your work, take care of things at home, or get along with other people?           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   -Denies alcohol use.   -Occasional soda but no regular caffeine use.     Sexual health:  -Reports new male sexual partner last week. Interested in STI screening. Has no symptoms.       Dental/vision/skin:  -Overdue for dental exam.  -Current with eye exams.     Past Medical History:   Diagnosis Date    ADHD (attention deficit hyperactivity disorder)     Depression     Frequent bowel movements     Gall stones     GERD (gastroesophageal reflux disease)     Kidney stones     Migraine     Pancreatitis        Past Surgical History:   Procedure Laterality Date    HERNIA MESH REMOVAL      INGUINAL HERNIA REPAIR         Family History   Problem Relation Age of Onset    Drug abuse Mother        Social History     Socioeconomic History    Marital status: Single   Tobacco Use    Smoking status: Every Day     Current packs/day: 0.50     Average packs/day: 0.5 packs/day for 19.5 years (9.8 ttl pk-yrs)     Types: Cigarettes     Start date: 2005    Smokeless tobacco: Never   Vaping Use    Vaping status: Every Day    Start date: 1/1/2018    Substances: Nicotine    Devices: Disposable   Substance and Sexual Activity    Alcohol use: Not Currently    Drug use: Not Currently     Types: Methamphetamines     Comment: 7 mo sober    Sexual activity: Yes       No Known Allergies    ROS    Review of Systems   Constitutional:  Positive for fatigue. Negative for appetite change, chills, diaphoresis and fever.   HENT:  Negative for congestion, hearing loss, sore throat and trouble swallowing.    Eyes:  Negative for blurred vision and visual disturbance.   Respiratory:  Negative for cough and shortness of breath.    Cardiovascular:  Negative for chest pain.   Gastrointestinal:  Positive for GERD. Negative for abdominal pain, blood in stool, constipation and diarrhea.   Endocrine: Negative for polyuria.   Genitourinary:  Negative for difficulty urinating, dysuria, genital sores, hematuria, scrotal swelling and testicular pain.   Musculoskeletal:  Negative for arthralgias and myalgias.   Skin:  Positive for rash (eczema). Negative for skin lesions.   Neurological:  Positive for dizziness. Negative for headache.    Psychiatric/Behavioral:  Negative for sleep disturbance, suicidal ideas and depressed mood. The patient is nervous/anxious.        Vitals:    07/15/24 1542   BP: 108/60   Pulse: 85   SpO2: 97%     Body mass index is 23.41 kg/m².      Current Outpatient Medications:     Blood Pressure Monitoring (Sphygmomanometer) misc, Use to monitor blood pressure once daily as directed., Disp: 1 each, Rfl: 0    hydrOXYzine (ATARAX) 25 MG tablet, Take 1-2 tablets by mouth Every Night., Disp: 60 tablet, Rfl: 5    prazosin (MINIPRESS) 2 MG capsule, Take 1 capsule by mouth Every Night., Disp: 30 capsule, Rfl: 5    mirtazapine (REMERON) 7.5 MG tablet, , Disp: , Rfl:     omeprazole (priLOSEC) 40 MG capsule, Take 1 capsule by mouth Daily. (Patient not taking: Reported on 7/15/2024), Disp: 30 capsule, Rfl: 5    venlafaxine XR (Effexor XR) 75 MG 24 hr capsule, Take 1 capsule by mouth Daily., Disp: 30 capsule, Rfl: 2    PE    Physical Exam  Vitals reviewed.   Constitutional:       General: He is not in acute distress.     Appearance: He is well-developed.   HENT:      Head: Normocephalic and atraumatic.      Right Ear: Hearing and tympanic membrane normal.      Left Ear: Hearing and tympanic membrane normal.      Mouth/Throat:      Mouth: Mucous membranes are moist.      Dentition: Normal dentition.      Pharynx: Oropharynx is clear.   Eyes:      Extraocular Movements: Extraocular movements intact.      Conjunctiva/sclera: Conjunctivae normal.      Pupils: Pupils are equal, round, and reactive to light.      Comments:      Neck:      Thyroid: No thyroid mass or thyromegaly.      Vascular: No carotid bruit.   Cardiovascular:      Rate and Rhythm: Normal rate and regular rhythm.      Heart sounds: No murmur heard.     No friction rub.   Pulmonary:      Effort: Pulmonary effort is normal.      Breath sounds: Normal breath sounds.   Abdominal:      General: Bowel sounds are normal. There is no abdominal bruit.      Palpations: Abdomen is  soft. There is no mass.      Tenderness: There is no abdominal tenderness.   Musculoskeletal:         General: Normal range of motion.      Cervical back: Normal range of motion and neck supple.   Lymphadenopathy:      Cervical: No cervical adenopathy.      Upper Body:      Right upper body: No supraclavicular adenopathy.      Left upper body: No supraclavicular adenopathy.   Skin:     General: Skin is warm and dry.      Findings: No rash.      Nails: There is no clubbing.      Comments: No suspicious nevi on clothed exam.    Neurological:      Mental Status: He is alert.      Gait: Gait normal.      Deep Tendon Reflexes: Reflexes normal.   Psychiatric:         Speech: Speech normal.         Behavior: Behavior normal.         A/P    Problem List Items Addressed This Visit          Cardiac and Vasculature    Mixed hyperlipidemia       Endocrine and Metabolic    Prediabetes    Relevant Orders    Hemoglobin A1c       Mental Health    Anxiety    Relevant Orders    Ambulatory Referral to Behavioral Health     Other Visit Diagnoses       Healthcare maintenance    -  Primary    At risk for sexually transmitted disease due to unprotected sex        Relevant Orders    Chlamydia trachomatis, Neisseria gonorrhoeae, Trichomonas vaginalis, PCR - Urine, Urine, Clean Catch    HIV-1 / O / 2 Ag / Antibody    RPR Qualitative with Reflex to Quant          -Counseled patient regarding cancer screening, immunizations, healthy lifestyle, diet, maintaining a healthy weight, and exercise.   -Annual dental and eye exams were encouraged.   -Labs as ordered.   -Tdap given in office.   -Encouraged smoking cessation. Patient declines assistance.   -Refer to behavioral health for anxiety management. He requests to taper off venlafaxine as he has been having some withdrawal side-effects if he misses a dose. Reduce to 75 mg daily. RTC in 2 months, sooner prn.       Plan of care was reviewed with patient at the conclusion of today's visit.  Education was provided regarding diagnoses, management, treatment plan, and the importance of keeping follow-up appointments. The patient was counseled regarding the risks, benefits, and possible side-effects of treatment. Patient and/or family expresses understanding and agreement with the management plan.        Ignacio Bearden PA-C

## 2024-07-15 NOTE — LETTER
[facility]  Vaccine Consent Form    Patient Name:  Jim Andrade  Patient :  1995     Vaccine(s) Ordered    Tdap Vaccine Greater Than or Equal To 8yo IM        Screening Checklist  The following questions should be completed prior to vaccination. If you answer “yes” to any question, it does not necessarily mean you should not be vaccinated. It just means we may need to clarify or ask more questions. If a question is unclear, please ask your healthcare provider to explain it.    Yes No   Any fever or moderate to severe illness today (mild illness and/or antibiotic treatment are not contraindications)?     Do you have a history of a serious reaction to any previous vaccinations, such as anaphylaxis, encephalopathy within 7 days, Guillain-[place] syndrome within 6 weeks, seizure?     Have you received any live vaccine(s) (e.g MMR, ASIM) or any other vaccines in the last month (to ensure duplicate doses aren't given)?     Do you have an anaphylactic allergy to latex (DTaP, DTaP-IPV, Hep A, Hep B, MenB, RV, Td, Tdap), baker’s yeast (Hep B, HPV), polysorbates (RSV, nirsevimab, PCV 20, Rotavirrus, Tdap, Shingrix), or gelatin (ASIM, MMR)?     Do you have an anaphylactic allergy to neomycin (Rabies, ASIM, MMR, IPV, Hep A), polymyxin B (IPV), or streptomycin (IPV)?      Any cancer, leukemia, AIDS, or other immune system disorder? (ASIM, MMR, RV)     Do you have a parent, brother, or sister with an immune system problem (if immune competence of vaccine recipient clinically verified, can proceed)? (MMR, ASIM)     Any recent steroid treatments for >2 weeks, chemotherapy, or radiation treatment? (ASIM, MMR)     Have you received antibody-containing blood transfusions or IVIG in the past 11 months (recommended interval is dependent on product)? (MMR, ASIM)     Have you taken antiviral drugs (acyclovir, famciclovir, valacyclovir for ASIM) in the last 24 or 48 hours, respectively?      Are you pregnant or planning to become  "pregnant within 1 month? (ASIM, MMR, HPV, IPV, MenB, Abrexvy; For Hep B- refer to Engerix-B; For RSV - Abrysvo is indicated for 32-36 weeks of pregnancy from September to January)     For infants, have you ever been told your child has had intussusception or a medical emergency involving obstruction of the intestine (Rotavirus)? If not for an infant, can skip this question.         *Ordering Physicians/APC should be consulted if \"yes\" is checked by the patient or guardian above.  I have received, read, and understand the Vaccine Information Statement (VIS) for each vaccine ordered.  I have considered my or my child's health status as well as the health status of my close contacts.  I have taken the opportunity to discuss my vaccine questions with my or my child's health care provider.   I have requested that the ordered vaccine(s) be given to me or my child.  I understand the benefits and risks of the vaccines.  I understand that I should remain in the clinic for 15 minutes after receiving the vaccine(s).  _________________________________________________________  Signature of Patient or Parent/Legal Guardian ____________________  Date     "

## 2024-07-16 ENCOUNTER — LAB (OUTPATIENT)
Dept: LAB | Facility: HOSPITAL | Age: 29
End: 2024-07-16
Payer: MEDICAID

## 2024-07-16 DIAGNOSIS — Z91.89 AT RISK FOR SEXUALLY TRANSMITTED DISEASE DUE TO UNPROTECTED SEX: ICD-10-CM

## 2024-07-16 DIAGNOSIS — R73.03 PREDIABETES: ICD-10-CM

## 2024-07-16 LAB — HBA1C MFR BLD: 6.2 % (ref 4.8–5.6)

## 2024-07-16 PROCEDURE — 86592 SYPHILIS TEST NON-TREP QUAL: CPT

## 2024-07-16 PROCEDURE — 83036 HEMOGLOBIN GLYCOSYLATED A1C: CPT

## 2024-07-16 PROCEDURE — G0432 EIA HIV-1/HIV-2 SCREEN: HCPCS

## 2024-07-17 LAB
HIV 1+2 AB+HIV1 P24 AG SERPL QL IA: NORMAL
RPR SER QL: NORMAL

## 2024-08-27 ENCOUNTER — OFFICE VISIT (OUTPATIENT)
Age: 29
End: 2024-08-27
Payer: MEDICAID

## 2024-08-27 DIAGNOSIS — F41.9 ANXIETY: Primary | ICD-10-CM

## 2024-08-27 PROCEDURE — 90791 PSYCH DIAGNOSTIC EVALUATION: CPT

## 2024-08-27 NOTE — PROGRESS NOTES
Initial Adult Evaluation      Date Encounter: 2024   Name: Jim Andrade  MRN: [id number]  : 1995    Time In: 14:35  Time Out: 15:30     Referring Provider: Ignacio Bearden PA-C    Chief Complaint:      ICD-10-CM ICD-9-CM   1. Anxiety  F41.9 300.00        History of Present Illness/Presenting Problem:   Jim Andrade is a 29 y.o. male who is being seen today for initial behavioral therapy visit to understand patient's mental health needs, complete a mental health assessment, and to begin to discuss therapy goals using psychotherapy and evidence-based tools in attempt to reduce symptoms of anxiety.  Patient was calm and pleasant during therapy visit today and was fully engaged in discussion.  Patient completed SAMANTA-7 and PHQ-9 during visit today which indicated mild symptoms of anxiety and moderate symptoms of depression, which can help to form short and long-term therapy goals.      Subjective     Screening Scores:   PHQ-9 : 10  SAMANTA-7 : 7    Legal History:  Patient had an issue when a teen where he was wrongfully accused of a crime, but it was expunged.  Patient received one year of counseling for incident.     Past Mental/Behavioral Health History:   History of prior/current treatment: Recovery program and primary care  History of prior/current outpatient therapy: ADHD specialist, counseling after legal experience, counseling through recovery program.    History of prior inpatient hospitalizations:   in [place] for suicidal ideation  Past diagnoses: Attachment disorder,   Past medications: Effexor as needed currently  History of suicide/self harm attempts:  suicide ideation but no attempt     SUICIDE RISK ASSESSMENT/CSSRS  Does patient have thoughts of suicide? no  Does patient have a plan for suicide? no  Does patient have a current plan for suicide? no  History of violent behaviors towards others or property? no  History of sexual aggression toward others: no  Access to  firearms or weapons: no    Abuse/Trauma History:   Physical abuse: Denied  Sexual abuse: Denied  Emotional/Neglect: Denied  Trauma: Adoption but does not recall from years old and younger   Death/Loss of relationship: Niece  from an overdose, aunt and grandfather    History of Substance Use:  Alcohol: Denied  Tobacco/Vape: Vapes currently but also uses cigarettes, but less.   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  Household members: Patient lives with others who are in recovery.   Relationship with family members: Patient has a healthy relationship with his parents.  His father understands him better than mother.    Difficulty making new/maintaining friendships: Friends and support   Faith/ Cultural Considerations: Denied, belief in God  Current Interests: Help others    Patient's Support Network Includes:  parents and friend(s)    Family History:  Family History   Problem Relation Age of Onset    Drug abuse Mother       Substance Use/Abuse: Patient's biological mother had substance use addiction.    Suicide attempts/completions: Unknown     Education/Work History:    Level of education: Associate's in business  Has patient experienced any issues or problems at work: Denied  IEP/504: N/A  Any  history family or personal: Unknown    Current occupation: [employer]     Past Surgical History:   Past Surgical History:   Procedure Laterality Date    HERNIA MESH REMOVAL      INGUINAL HERNIA REPAIR         Medications:     Current Outpatient Medications:     Blood Pressure Monitoring (Sphygmomanometer) misc, Use to monitor blood pressure once daily as directed., Disp: 1 each, Rfl: 0    hydrOXYzine (ATARAX) 25 MG tablet, Take 1-2 tablets by mouth Every Night., Disp: 60 tablet,  Rfl: 5    mirtazapine (REMERON) 7.5 MG tablet, , Disp: , Rfl:     omeprazole (priLOSEC) 40 MG capsule, Take 1 capsule by mouth Daily. (Patient not taking: Reported on 7/15/2024), Disp: 30 capsule, Rfl: 5    prazosin (MINIPRESS) 2 MG capsule, Take 1 capsule by mouth Every Night., Disp: 30 capsule, Rfl: 5    venlafaxine XR (Effexor XR) 75 MG 24 hr capsule, Take 1 capsule by mouth Daily., Disp: 30 capsule, Rfl: 2    Allergies:   No Known Allergies     Objective       Mental Status Exam:   MENTAL STATUS EXAM   General Appearance:  Cleanly groomed and dressed  Eye Contact:  Good eye contact  Attitude:  Cooperative and polite  Motor Activity:  Normal gait, posture  Muscle Strength:  Normal  Speech:  Normal rate, tone, volume and hyper talkative  Mood and affect:  Normal, pleasant and appropriate  Hopelessness:  4  Loneliness: 8  Associations/ Thought Content:  No delusions  Hallucinations:  None  Suicidal Ideations:  Not present  Homicidal Ideation:  Not present  Sensorium:  Alert and clear  Orientation:  Person, place, time and situation  Immediate Recall, Recent, and Remote Memory:  Intact  Attention Span/ Concentration:  Good  Fund of Knowledge:  Appropriate for age and educational level  Intellectual Functioning:  Average range  Insight:  Fair  Judgement:  Fair  Reliability:  Fair  Impulse Control:  Fair      Assessment / Plan      Visit Diagnosis/Orders Placed This Visit:    ICD-10-CM ICD-9-CM   1. Anxiety  F41.9 300.00        PLAN:    Progress toward goal: Not at goal    Strengths and Abilities: Compassionate about helping others, friendly, interested in making self-improvements.    Prognosis: Good with ongoing treatment    Safety: No acute safety concerns.  This is patient's first session.  Therapist will continue to monitor.   Risk Assessment: Risk of self-harm acutely is low. Risk of self-harm chronically is also low, but could be further elevated in the event of treatment noncompliance and/or substance abuse.      Treatment Plan/Goals: Continue supportive psychotherapy efforts and medications as indicated. Treatment and medication options discussed during today's visit. Patient acknowledged and verbally consented to continue with current treatment plan and was educated on the importance of compliance with treatment and follow-up appointments. Patient seems reasonably determined to adhere to treatment plan.      Assisted Patient in processing above session content; acknowledged and normalized patient’s thoughts, feelings, and concerns.  Rationalized patient thought process regarding discussion about therapy goals and where he would like to focus in therapy sessions.  Patient reported having a recovery anniversary in September that is special to him and celebratory.  Patient also discussed the importance of his continued recovery and attending meetings weekly.  Patient was receiving mental health therapy through his recovery program, but decided to pursue individual therapy separately.  Patient discussed his sexual health as an ongoing discussion to learn more about himself and interacting with others in a sexual way.  Patient was provided with cognitive triangle and cognitive distortions to consider in the initial process of having irrational thoughts and identifying them to make change and create healthy and helpful thought processes.    Allowed Patient to freely discuss issues  without interruption or judgement with unconditional positive regard, active listening skills, and empathy. Therapist provided a safe, confidential environment to facilitate the development of a positive therapeutic relationship and encouraged open, honest communication. Assisted Patient in identifying risk factors which would indicate the need for higher level of care including thoughts to harm self or others and/or self-harming behavior and encouraged Patient to contact this office during business hours, call 911, or present to the nearest  emergency room should any of these events occur. Discussed crisis intervention services and means to access. Patient adamantly and convincingly denies current suicidal or homicidal ideation or perceptual disturbance. Assisted Patient in processing session content; acknowledged and normalized Patient’s thoughts, feelings, and concerns by utilizing a person-centered approach in efforts to build appropriate rapport and a positive therapeutic relationship with open and honest communication.     Follow Up:   Return in about 4 weeks (around 9/24/2024) for Therapy.       This document has been electronically signed by Agustina Castrejon LCSW  August 27, 2024 15:55 EDT

## 2024-08-30 ENCOUNTER — APPOINTMENT (OUTPATIENT)
Dept: CT IMAGING | Facility: HOSPITAL | Age: 29
End: 2024-08-30
Payer: MEDICAID

## 2024-08-30 ENCOUNTER — HOSPITAL ENCOUNTER (EMERGENCY)
Facility: HOSPITAL | Age: 29
Discharge: HOME | End: 2024-08-30
Attending: EMERGENCY MEDICINE
Payer: MEDICAID

## 2024-08-30 VITALS
OXYGEN SATURATION: 95 % | RESPIRATION RATE: 14 BRPM | BODY MASS INDEX: 23.03 KG/M2 | DIASTOLIC BLOOD PRESSURE: 84 MMHG | WEIGHT: 143.3 LBS | HEART RATE: 80 BPM | HEIGHT: 66 IN | TEMPERATURE: 98.6 F | SYSTOLIC BLOOD PRESSURE: 122 MMHG

## 2024-08-30 DIAGNOSIS — K80.50 BILIARY COLIC: ICD-10-CM

## 2024-08-30 DIAGNOSIS — R10.13 DYSPEPSIA: Primary | ICD-10-CM

## 2024-08-30 LAB
ALBUMIN SERPL-MCNC: 3.9 G/DL (ref 3.5–5.2)
ALBUMIN/GLOB SERPL: 1.7 G/DL
ALP SERPL-CCNC: 78 U/L (ref 39–117)
ALT SERPL W P-5'-P-CCNC: 25 U/L (ref 1–41)
ANION GAP SERPL CALCULATED.3IONS-SCNC: 13 MMOL/L (ref 5–15)
AST SERPL-CCNC: 18 U/L (ref 1–40)
BASOPHILS # BLD AUTO: 0.04 10*3/MM3 (ref 0–0.2)
BASOPHILS NFR BLD AUTO: 0.4 % (ref 0–1.5)
BILIRUB SERPL-MCNC: 0.2 MG/DL (ref 0–1.2)
BILIRUB UR QL STRIP: NEGATIVE
BUN SERPL-MCNC: 15 MG/DL (ref 6–20)
BUN/CREAT SERPL: 17.9 (ref 7–25)
CALCIUM SPEC-SCNC: 9.1 MG/DL (ref 8.6–10.5)
CHLORIDE SERPL-SCNC: 102 MMOL/L (ref 98–107)
CLARITY UR: CLEAR
CO2 SERPL-SCNC: 26 MMOL/L (ref 22–29)
COLOR UR: YELLOW
CREAT SERPL-MCNC: 0.84 MG/DL (ref 0.76–1.27)
DEPRECATED RDW RBC AUTO: 42 FL (ref 37–54)
EGFRCR SERPLBLD CKD-EPI 2021: 119.6 ML/MIN/1.73
EOSINOPHIL # BLD AUTO: 0.31 10*3/MM3 (ref 0–0.4)
EOSINOPHIL NFR BLD AUTO: 2.9 % (ref 0.3–6.2)
ERYTHROCYTE [DISTWIDTH] IN BLOOD BY AUTOMATED COUNT: 13.2 % (ref 12.3–15.4)
GLOBULIN UR ELPH-MCNC: 2.3 GM/DL
GLUCOSE SERPL-MCNC: 92 MG/DL (ref 65–99)
GLUCOSE UR STRIP-MCNC: NEGATIVE MG/DL
HCT VFR BLD AUTO: 44.2 % (ref 37.5–51)
HGB BLD-MCNC: 14.9 G/DL (ref 13–17.7)
HGB UR QL STRIP.AUTO: NEGATIVE
IMM GRANULOCYTES # BLD AUTO: 0.03 10*3/MM3 (ref 0–0.05)
IMM GRANULOCYTES NFR BLD AUTO: 0.3 % (ref 0–0.5)
KETONES UR QL STRIP: NEGATIVE
LEUKOCYTE ESTERASE UR QL STRIP.AUTO: NEGATIVE
LIPASE SERPL-CCNC: 35 U/L (ref 13–60)
LYMPHOCYTES # BLD AUTO: 2.78 10*3/MM3 (ref 0.7–3.1)
LYMPHOCYTES NFR BLD AUTO: 26.4 % (ref 19.6–45.3)
MCH RBC QN AUTO: 29.4 PG (ref 26.6–33)
MCHC RBC AUTO-ENTMCNC: 33.7 G/DL (ref 31.5–35.7)
MCV RBC AUTO: 87.2 FL (ref 79–97)
MONOCYTES # BLD AUTO: 0.91 10*3/MM3 (ref 0.1–0.9)
MONOCYTES NFR BLD AUTO: 8.6 % (ref 5–12)
NEUTROPHILS NFR BLD AUTO: 6.46 10*3/MM3 (ref 1.7–7)
NEUTROPHILS NFR BLD AUTO: 61.4 % (ref 42.7–76)
NITRITE UR QL STRIP: NEGATIVE
NRBC BLD AUTO-RTO: 0 /100 WBC (ref 0–0.2)
PH UR STRIP.AUTO: 6.5 [PH] (ref 5–8)
PLATELET # BLD AUTO: 330 10*3/MM3 (ref 140–450)
PMV BLD AUTO: 8.7 FL (ref 6–12)
POTASSIUM SERPL-SCNC: 3.3 MMOL/L (ref 3.5–5.2)
PROT SERPL-MCNC: 6.2 G/DL (ref 6–8.5)
PROT UR QL STRIP: NEGATIVE
RBC # BLD AUTO: 5.07 10*6/MM3 (ref 4.14–5.8)
SODIUM SERPL-SCNC: 141 MMOL/L (ref 136–145)
SP GR UR STRIP: 1.02 (ref 1–1.03)
TROPONIN T SERPL HS-MCNC: <6 NG/L
UROBILINOGEN UR QL STRIP: NORMAL
WBC NRBC COR # BLD AUTO: 10.53 10*3/MM3 (ref 3.4–10.8)

## 2024-08-30 PROCEDURE — 96374 THER/PROPH/DIAG INJ IV PUSH: CPT

## 2024-08-30 PROCEDURE — 96375 TX/PRO/DX INJ NEW DRUG ADDON: CPT

## 2024-08-30 PROCEDURE — 74176 CT ABD & PELVIS W/O CONTRAST: CPT

## 2024-08-30 PROCEDURE — 84484 ASSAY OF TROPONIN QUANT: CPT | Performed by: EMERGENCY MEDICINE

## 2024-08-30 PROCEDURE — 85025 COMPLETE CBC W/AUTO DIFF WBC: CPT | Performed by: EMERGENCY MEDICINE

## 2024-08-30 PROCEDURE — 80053 COMPREHEN METABOLIC PANEL: CPT | Performed by: EMERGENCY MEDICINE

## 2024-08-30 PROCEDURE — 25810000003 SODIUM CHLORIDE 0.9 % SOLUTION: Performed by: EMERGENCY MEDICINE

## 2024-08-30 PROCEDURE — 81003 URINALYSIS AUTO W/O SCOPE: CPT | Performed by: EMERGENCY MEDICINE

## 2024-08-30 PROCEDURE — 25010000002 MORPHINE PER 10 MG: Performed by: EMERGENCY MEDICINE

## 2024-08-30 PROCEDURE — 99284 EMERGENCY DEPT VISIT MOD MDM: CPT

## 2024-08-30 PROCEDURE — 25010000002 ONDANSETRON PER 1 MG: Performed by: EMERGENCY MEDICINE

## 2024-08-30 PROCEDURE — 83690 ASSAY OF LIPASE: CPT | Performed by: EMERGENCY MEDICINE

## 2024-08-30 RX ORDER — ONDANSETRON 2 MG/ML
4 INJECTION INTRAMUSCULAR; INTRAVENOUS ONCE
Status: COMPLETED | OUTPATIENT
Start: 2024-08-30 | End: 2024-08-30

## 2024-08-30 RX ORDER — POTASSIUM CHLORIDE 750 MG/1
20 CAPSULE, EXTENDED RELEASE ORAL ONCE
Status: COMPLETED | OUTPATIENT
Start: 2024-08-30 | End: 2024-08-30

## 2024-08-30 RX ORDER — MORPHINE SULFATE 2 MG/ML
2 INJECTION, SOLUTION INTRAMUSCULAR; INTRAVENOUS ONCE
Status: COMPLETED | OUTPATIENT
Start: 2024-08-30 | End: 2024-08-30

## 2024-08-30 RX ORDER — ONDANSETRON 4 MG/1
4 TABLET, ORALLY DISINTEGRATING ORAL EVERY 8 HOURS PRN
Qty: 15 TABLET | Refills: 0 | Status: SHIPPED | OUTPATIENT
Start: 2024-08-30 | End: 2024-09-04

## 2024-08-30 RX ADMIN — SODIUM CHLORIDE 1000 ML: 9 INJECTION, SOLUTION INTRAVENOUS at 04:18

## 2024-08-30 RX ADMIN — ONDANSETRON 4 MG: 2 INJECTION INTRAMUSCULAR; INTRAVENOUS at 04:18

## 2024-08-30 RX ADMIN — MORPHINE SULFATE 2 MG: 2 INJECTION, SOLUTION INTRAMUSCULAR; INTRAVENOUS at 04:19

## 2024-08-30 RX ADMIN — POTASSIUM CHLORIDE 20 MEQ: 750 CAPSULE, EXTENDED RELEASE ORAL at 05:52

## 2024-08-30 NOTE — ED PROVIDER NOTES
Subjective   History of Present Illness  Is a 29-year-old male with past medical history of gallstones pancreatitis presented to the emergency department with some upper epigastric pain.  He states that he has had it for the last 24 hours.  States that it is a dull burning pain.  Radiates to his back and up into his chest.  Has been very nauseous but denies any vomiting.  Does not have any diarrhea.  Denies any fevers or chills.  No headache or change in vision.  No focal weakness.  No chest pain or shortness of breath.  No hematuria or dysuria.    History provided by:  Patient   used: No        Review of Systems   Constitutional:  Negative for chills and fever.   HENT:  Negative for congestion, ear pain and sore throat.    Eyes:  Negative for visual disturbance.   Respiratory:  Negative for shortness of breath.    Cardiovascular:  Negative for chest pain.   Gastrointestinal:  Positive for abdominal pain and nausea.   Genitourinary:  Negative for difficulty urinating.   Musculoskeletal:  Negative for arthralgias.   Skin:  Negative for rash.   Neurological:  Negative for dizziness, weakness and numbness.   Psychiatric/Behavioral:  Negative for agitation.        Past Medical History:   Diagnosis Date    ADHD (attention deficit hyperactivity disorder)     Depression     Frequent bowel movements     Gall stones     GERD (gastroesophageal reflux disease)     Kidney stones     Migraine     Pancreatitis        No Known Allergies    Past Surgical History:   Procedure Laterality Date    HERNIA MESH REMOVAL      INGUINAL HERNIA REPAIR         Family History   Problem Relation Age of Onset    Drug abuse Mother        Social History     Socioeconomic History    Marital status: Single   Tobacco Use    Smoking status: Every Day     Current packs/day: 0.50     Average packs/day: 0.5 packs/day for 19.7 years (9.8 ttl pk-yrs)     Types: Cigarettes     Start date: 2005    Smokeless tobacco: Never   Vaping Use     Vaping status: Every Day    Start date: 1/1/2018    Substances: Nicotine    Devices: Disposable   Substance and Sexual Activity    Alcohol use: Not Currently    Drug use: Not Currently     Types: Methamphetamines     Comment: 7 mo sober    Sexual activity: Yes           Objective   Physical Exam  Vitals and nursing note reviewed.   Constitutional:       General: He is not in acute distress.     Appearance: He is not ill-appearing or toxic-appearing.   HENT:      Mouth/Throat:      Pharynx: No posterior oropharyngeal erythema.   Eyes:      Extraocular Movements: Extraocular movements intact.      Pupils: Pupils are equal, round, and reactive to light.   Cardiovascular:      Rate and Rhythm: Normal rate and regular rhythm.   Pulmonary:      Effort: Pulmonary effort is normal. No respiratory distress.   Abdominal:      General: Abdomen is flat. There is no distension.      Palpations: There is no mass.      Tenderness: There is abdominal tenderness in the epigastric area. There is no guarding or rebound.   Musculoskeletal:         General: No deformity. Normal range of motion.   Neurological:      General: No focal deficit present.      Mental Status: He is alert.      Sensory: No sensory deficit.      Motor: No weakness.         Procedures           ED Course  ED Course as of 08/30/24 0553   Fri Aug 30, 2024   0552 BP: 122/84 [JK]   0552 Temp: 98.6 °F (37 °C) [JK]   0552 Temp src: Oral [JK]   0552 Heart Rate: 80 [JK]   0552 Resp: 14 [JK]   0552 SpO2: 96 %  Interpretation:  Patient's repeat vitals, telemetry tracing, and pulse oximetry tracing were directly viewed and interpreted by myself.   O2 sat 96% on room air, interpreted as normal  Telemetry rhythm strip revealed a rate of 80 bpm, interpreted as normal sinus rhythm [JK]   0552 Comprehensive Metabolic Panel(!) [JK]   0552 Lipase [JK]   0552 Single High Sensitivity Troponin T [JK]   0552 CBC & Differential(!) [JK]   0552 Urinalysis With Microscopic If Indicated  (No Culture) - Urine, Clean Catch  Interpretation:  Laboratory studies were reviewed and interpreted directly by myself.  CMP showed some minor hypokalemia with potassium of 3.3, lipase normal, troponin normal, CBC normal, urinalysis normal [JK]   0595 CT Abdomen Pelvis Without Contrast  Interpretation:  Imaging was directly visualized by myself, per my interpretations, CT abdomen pelvis was unremarkable. [JK]   0552 On reevaluation, the patient states that they are feeling much better.  Patient tolerated by mouth challenge of juice and crackers without difficulty.  They are not complaining of any new abdominal pain.  Repeat examination did not show any significant guarding or rebound.  No evidence of peritonitis.  No acute no tenderness.  Patient was given strict return precautions for acute abdomen.  They need to be reevaluated within 24 hours for acute abdomen by PCP or return to the emergency department for reexamination.     [JK]   0509 I had a discussion with the patient/family regarding diagnosis, diagnostic results, treatment plan, and medications. The patient/family indicated understanding of these instructions. I spent adequate time at the bedside prior to discharge necessary to discuss the aftercare instructions, giving patient education, providing explanations of the results of our evaluations/findings, and my decision making to assure that the patient/family understand the plan of care. Time was allotted to answer questions at that time and throughout the ED course. Patient is required to maintain timely follow up, as discussed. I also discussed the potential for the development of an acute emergent condition requiring further evaluation, return to the ER, admission, or even surgical intervention.  I encouraged the patient to return to the emergency department immediately for any concerns, worsening symptoms, new complaints, or if symptoms persist and they are unable to seek follow-up in a timely  fashion. The patient/family expressed understanding and agreement with this plan    Shared decision making:   After full review of the patient's clinical presentation, review of any work-up including but not limited to laboratory studies and radiology obtained, I had a discussion with the patient.  Treatment options were discussed as well as the risks, benefits and consequences.  I discussed all findings with the patient and family members if available.  During the discussion, treatment goals were understood by all as well as any misconceptions which were addressed with the patient.  Ample time was given for any questions they may have had.  They are in agreement with the treatment plan as well as final disposition. [JK]      ED Course User Index  [JK] Daniel Pat MD                                             Medical Decision Making  This is a 29-year-old male with a history of pancreatitis and gallstones presenting to the emergency department with some upper abdominal discomfort.  The patient symptoms are concerning for dyspepsia, esophagitis possibly pancreatitis.  He does have some focal tenderness on exam, however there is no evidence of acute abdomen or peritonitis.  Overall, the patient is nontoxic.  Afebrile.  IV access was established and the patient.  Placed on continuous telemetry monitoring.  Given the patient's presentation, differential is broad and will require further evaluation.  Workup initiated.      Differential diagnosis: Peptic ulcer disease, dyspepsia, GERD, pancreatitis, biliary colic, electrolyte abnormality, acute kidney injury      Amount and/or Complexity of Data Reviewed  External Data Reviewed: labs, radiology, ECG and notes.     Details: External laboratories, imaging as well as notes were reviewed personally by myself.  All relevant studies were used to guide decision making.     Date of previous record: 7/15/2024    Source of note: Primary physician    Summary:  Patient was  seen and evaluated for routine visit.  I did review basic laboratory studies on file as well as a previous chest x-ray and EKG.  Records reviewed    Labs: ordered. Decision-making details documented in ED Course.  Radiology: ordered and independent interpretation performed. Decision-making details documented in ED Course.    Risk  Prescription drug management.        Final diagnoses:   Dyspepsia   Biliary colic       ED Disposition  ED Disposition       ED Disposition   Discharge    Condition   Stable    Comment   --               Ignacio Bearden PA-C  [address]  [place] [id number] [phone]    Call in 1 day           Medication List        New Prescriptions      ondansetron ODT 4 MG disintegrating tablet  Commonly known as: ZOFRAN-ODT  Place 1 tablet on the tongue Every 8 (Eight) Hours As Needed for Nausea or Vomiting for up to 5 days.               Where to Get Your Medications        These medications were sent to [facility] - [place], [place] - [address]  - [phone]  - [phone]  [address] [facility] [address]      Phone: [phone]   ondansetron ODT 4 MG disintegrating tablet            Daniel Pat MD  08/30/24 9137     Patient/Caregiver provided printed discharge information.

## 2024-09-08 ENCOUNTER — HOSPITAL ENCOUNTER (EMERGENCY)
Facility: HOSPITAL | Age: 29
Discharge: HOME | End: 2024-09-08
Attending: EMERGENCY MEDICINE | Admitting: EMERGENCY MEDICINE
Payer: MEDICAID

## 2024-09-08 VITALS
RESPIRATION RATE: 16 BRPM | HEART RATE: 90 BPM | TEMPERATURE: 98.5 F | OXYGEN SATURATION: 98 % | BODY MASS INDEX: 21.97 KG/M2 | DIASTOLIC BLOOD PRESSURE: 88 MMHG | SYSTOLIC BLOOD PRESSURE: 133 MMHG | WEIGHT: 140 LBS | HEIGHT: 67 IN

## 2024-09-08 DIAGNOSIS — L02.11 ABSCESS OF SKIN OF NECK: Primary | ICD-10-CM

## 2024-09-08 PROCEDURE — 99283 EMERGENCY DEPT VISIT LOW MDM: CPT

## 2024-09-08 RX ORDER — LIDOCAINE HYDROCHLORIDE AND EPINEPHRINE 10; 10 MG/ML; UG/ML
10 INJECTION, SOLUTION INFILTRATION; PERINEURAL ONCE
Status: COMPLETED | OUTPATIENT
Start: 2024-09-08 | End: 2024-09-08

## 2024-09-08 RX ORDER — SULFAMETHOXAZOLE/TRIMETHOPRIM 800-160 MG
1 TABLET ORAL ONCE
Status: COMPLETED | OUTPATIENT
Start: 2024-09-08 | End: 2024-09-08

## 2024-09-08 RX ORDER — SULFAMETHOXAZOLE/TRIMETHOPRIM 800-160 MG
1 TABLET ORAL 2 TIMES DAILY
Qty: 10 TABLET | Refills: 0 | Status: SHIPPED | OUTPATIENT
Start: 2024-09-08

## 2024-09-08 RX ORDER — ACETAMINOPHEN 500 MG
1000 TABLET ORAL EVERY 6 HOURS PRN
Qty: 30 TABLET | Refills: 0 | Status: SHIPPED | OUTPATIENT
Start: 2024-09-08

## 2024-09-08 RX ADMIN — LIDOCAINE HYDROCHLORIDE AND EPINEPHRINE 10 ML: 10; 10 INJECTION, SOLUTION INFILTRATION; PERINEURAL at 19:04

## 2024-09-08 RX ADMIN — SULFAMETHOXAZOLE AND TRIMETHOPRIM 1 TABLET: 800; 160 TABLET ORAL at 19:16

## 2024-09-08 NOTE — ED PROVIDER NOTES
Subjective   History of Present Illness  Patient is a 29-year-old male presenting to the emergency department with abscess or cyst on the right posterior aspect of the neck which is getting more uncomfortable and growing.  Symptoms worsening since yesterday.  No fever or chills.  No other acute symptoms or complaints.    History provided by:  Patient      Review of Systems    Past Medical History:   Diagnosis Date    ADHD (attention deficit hyperactivity disorder)     Depression     Frequent bowel movements     Gall stones     GERD (gastroesophageal reflux disease)     Kidney stones     Migraine     Pancreatitis        No Known Allergies    Past Surgical History:   Procedure Laterality Date    HERNIA MESH REMOVAL      INGUINAL HERNIA REPAIR         Family History   Problem Relation Age of Onset    Drug abuse Mother        Social History     Socioeconomic History    Marital status: Single   Tobacco Use    Smoking status: Every Day     Current packs/day: 0.50     Average packs/day: 0.5 packs/day for 19.7 years (9.8 ttl pk-yrs)     Types: Cigarettes     Start date: 2005    Smokeless tobacco: Never   Vaping Use    Vaping status: Every Day    Start date: 1/1/2018    Substances: Nicotine    Devices: Disposable   Substance and Sexual Activity    Alcohol use: Not Currently    Drug use: Not Currently     Types: Methamphetamines     Comment: 7 mo sober    Sexual activity: Yes           Objective   Physical Exam  Vitals and nursing note reviewed.   Constitutional:       General: He is not in acute distress.     Appearance: Normal appearance. He is not toxic-appearing.   Neck:      Comments: 1cm x 2cm superficial abscess of right posterior neck  Neurological:      Mental Status: He is alert and oriented to person, place, and time.   Psychiatric:         Mood and Affect: Mood normal.         Behavior: Behavior normal.         Incision & Drainage    Date/Time: 9/8/2024 10:56 PM    Performed by: Jerardo Sutton,  "MD  Authorized by: Jerardo Sutton MD    Consent:     Consent obtained:  Verbal    Consent given by:  Patient    Risks discussed:  Bleeding, incomplete drainage, pain, infection and damage to other organs  Universal protocol:     Procedure explained and questions answered to patient or proxy's satisfaction: yes      Immediately prior to procedure, a time out was called: yes      Patient identity confirmed:  Verbally with patient  Location:     Type:  Abscess    Size:  2cm x 3cm    Location:  Neck    Neck location:  R posterior  Pre-procedure details:     Skin preparation:  Povidone-iodine  Anesthesia:     Anesthesia method:  Local infiltration    Local anesthetic:  Lidocaine 1% WITH epi  Procedure type:     Complexity:  Simple  Procedure details:     Incision types:  Single straight    Incision depth:  Subcutaneous    Wound management:  Probed and deloculated and irrigated with saline    Drainage:  Purulent    Drainage amount:  Scant    Wound treatment:  Wound left open    Packing materials:  1/4 in iodoform gauze    Amount 1/4\" iodoform:  1 inch  Post-procedure details:     Procedure completion:  Tolerated well, no immediate complications             ED Course  ED Course as of 09/08/24 2257   Sun Sep 08, 2024   1846 Incision and drainage performed at the bedside.  Small amount of pus noted.  Packing in place.  Follow-up and return instructions discussed.  Patient return to the ED as necessary.  Otherwise, he is to follow-up with his doctor in 1 week for recheck.  Patient is to return remove the packing in 2 days. I have reviewed results, considerations, and diagnosis with the patient and/or their representative. Anticipatory guidance provided. Follow-up plan reviewed. Precautions for acute return for re-evaluations also reviewed. This including potential for worsening of the presenting condition and need for further evaluation, admission, and/or intervention as indicated. Opportunity to as questions provided. " I advised them to return for any concerns and stressed the importance of timely follow-up and outpatient services. They verbalized understanding.   [RS]   1846 Note to patient: The 21st Century Cures Act makes medical notes like these available to patients in the interest of transparency. However, be advised this is a medical document. It is intended as peer to peer communication. It is written in medical language and may contain abbreviations or verbiage that are unfamiliar. It may appear blunt or direct. Medical documents are intended to carry relevant information, facts as evident, and the clinical opinion of the physician/NPP.   [RS]      ED Course User Index  [RS] Jerardo Sutton MD                                             Medical Decision Making  Problems Addressed:  Abscess of skin of neck: complicated acute illness or injury    Risk  OTC drugs.  Prescription drug management.        Final diagnoses:   Abscess of skin of neck       ED Disposition  ED Disposition       ED Disposition   Discharge    Condition   Stable    Comment   --               Ignacio Bearden PA-C  [address] [place] [id number]  [phone]    In 1 week  For wound re-check    [facility] EMERGENCY DEPARTMENT  [address] [facility] [address] [phone]    As needed, If symptoms worsen or ANY concerns.         Medication List        New Prescriptions      acetaminophen 500 MG tablet  Commonly known as: TYLENOL  Take 2 tablets by mouth Every 6 (Six) Hours As Needed for Mild Pain or Moderate Pain.     sulfamethoxazole-trimethoprim 800-160 MG per tablet  Commonly known as: BACTRIM DS,SEPTRA DS  Take 1 tablet by mouth 2 (Two) Times a Day.               Where to Get Your Medications        These medications were sent to [facility] - [place], [place] - [address] [place] [address] - [phone] [facility] [phone]   [address] [facility] [address]      Phone: [phone]   acetaminophen 500 MG  tablet  sulfamethoxazole-trimethoprim 800-160 MG per tablet            Jerardo Sutton MD  09/08/24 0736

## 2024-09-08 NOTE — DISCHARGE INSTRUCTIONS
Remove the packing in 2 to 3 days if it has not already fallen out.  Otherwise, follow-up with your doctor in 1 week for recheck as necessary.  Return to the ER for concerns.

## 2024-09-12 RX ORDER — VENLAFAXINE HYDROCHLORIDE 75 MG/1
75 CAPSULE, EXTENDED RELEASE ORAL DAILY
Qty: 30 CAPSULE | Refills: 2 | OUTPATIENT
Start: 2024-09-12

## 2024-09-12 RX ORDER — OMEPRAZOLE 40 MG/1
40 CAPSULE, DELAYED RELEASE ORAL DAILY
Qty: 90 CAPSULE | Refills: 0 | Status: SHIPPED | OUTPATIENT
Start: 2024-09-12

## 2024-10-08 ENCOUNTER — OFFICE VISIT (OUTPATIENT)
Age: 29
End: 2024-10-08
Payer: MEDICAID

## 2024-10-08 DIAGNOSIS — F32.A DEPRESSION, UNSPECIFIED DEPRESSION TYPE: ICD-10-CM

## 2024-10-08 DIAGNOSIS — F41.9 ANXIETY: Primary | ICD-10-CM

## 2024-10-08 PROCEDURE — 1160F RVW MEDS BY RX/DR IN RCRD: CPT

## 2024-10-08 PROCEDURE — 1159F MED LIST DOCD IN RCRD: CPT

## 2024-10-08 PROCEDURE — 90832 PSYTX W PT 30 MINUTES: CPT

## 2024-10-08 NOTE — PROGRESS NOTES
Adult Follow Up       Date Encounter: 10/08/2024   Name: Jim Andrade  MRN: [id number]  : 1995    Time In: 11:15  Time Out: 11:53     Referring Provider: Ignacio Bearden PA-C    Chief Complaint:      ICD-10-CM ICD-9-CM   1. Anxiety  F41.9 300.00   2. Depression, unspecified depression type  F32.A 311        History of Present Illness:   Jim Andrade is a 29 y.o. male who is being seen today for follow-up behavioral health therapy visit to understand patient's mental health needs and to have continued discussion about therapy goals using psychotherapy and evidence-based tools in attempt to reduce symptoms of anxiety and depression.  Patient was calm and pleasant during therapy session today and reported fatigue from having gall bladder issues which has affected his mental health.  Patient reported having to adhere to a strict diet and finds that the food is unaffordable.  Patient completed PHQ-9 and SAMANTA-7 during visit today which resulted in symptoms of minimal anxiety and mild depression which were reductions of scores since patient's last therapy session and assessments.      Subjective     Screening Scores:   PHQ-9 : 8  SAMANTA-7 : 4    SUICIDE RISK ASSESSMENT/CSSRS  Does patient have thoughts of suicide? no  Does patient have a plan for suicide? no    Medications:     Current Outpatient Medications:     acetaminophen (TYLENOL) 500 MG tablet, Take 2 tablets by mouth Every 6 (Six) Hours As Needed for Mild Pain or Moderate Pain., Disp: 30 tablet, Rfl: 0    Blood Pressure Monitoring (Sphygmomanometer) misc, Use to monitor blood pressure once daily as directed., Disp: 1 each, Rfl: 0    hydrOXYzine (ATARAX) 25 MG tablet, Take 1-2 tablets by mouth Every Night., Disp: 60 tablet, Rfl: 5    mirtazapine (REMERON) 7.5 MG tablet, , Disp: , Rfl:     omeprazole (priLOSEC) 40 MG capsule, Take 1 capsule by mouth Daily., Disp: 90 capsule, Rfl: 0    prazosin (MINIPRESS) 2 MG capsule, Take 1 capsule by mouth  Every Night., Disp: 30 capsule, Rfl: 5    sulfamethoxazole-trimethoprim (BACTRIM DS,SEPTRA DS) 800-160 MG per tablet, Take 1 tablet by mouth 2 (Two) Times a Day., Disp: 10 tablet, Rfl: 0    venlafaxine XR (Effexor XR) 75 MG 24 hr capsule, Take 1 capsule by mouth Daily., Disp: 30 capsule, Rfl: 2    Allergies:   No Known Allergies     Objective       Mental Status Exam:   MENTAL STATUS EXAM   General Appearance:  Cleanly groomed and dressed  Eye Contact:  Good eye contact  Attitude:  Cooperative and polite  Motor Activity:  Normal gait, posture  Muscle Strength:  Normal  Speech:  Normal rate, tone, volume  Language:  Spontaneous  Mood and affect:  Normal, pleasant  Thought Process:  Logical  Associations/ Thought Content:  No delusions  Hallucinations:  None  Suicidal Ideations:  Not present  Homicidal Ideation:  Not present  Sensorium:  Alert and clear  Orientation:  Person, place, time and situation  Immediate Recall, Recent, and Remote Memory:  Intact  Attention Span/ Concentration:  Good and selective attention  Fund of Knowledge:  Appropriate for age and educational level  Intellectual Functioning:  Average range  Insight:  Fair  Judgement:  Fair  Reliability:  Fair  Impulse Control:  Fair      Assessment / Plan      Visit Diagnosis/Orders Placed This Visit:    ICD-10-CM ICD-9-CM   1. Anxiety  F41.9 300.00   2. Depression, unspecified depression type  F32.A 311        PLAN:    Progress toward goal: Not at goal    Prognosis: Fair with ongoing treatment    Safety: No acute safety concerns.  Therapist will continue to monitor.    Risk Assessment: Risk of self-harm acutely is low. Risk of self-harm chronically is also low, but could be further elevated in the event of treatment noncompliance and/or substance abuse.     Treatment Plan/Goals: Continue supportive psychotherapy efforts and medications as indicated. Treatment and medication options discussed during today's visit. Patient acknowledged and verbally  consented to continue with current treatment plan and was educated on the importance of compliance with treatment and follow-up appointments. Patient seems reasonably determined to adhere to treatment plan.      Assisted Patient in processing above session content; acknowledged and normalized patient’s thoughts, feelings, and concerns.  Rationalized patient thought process regarding his health and taking strides in setting goals through gaining new work experiences and using mindfulness for relaxation and grounding.  Patient was provided with information on an additional food pantry to help with food insecurity.  Patient was also provided with coping skills for anxiety and depression, which can help offer relaxation, mindfulness, Socratic questioning, and progressive muscle relaxation that may help reduce emotional stress and symptoms of anxiety and depression that he can attempt before next therapy session.    Allowed Patient to freely discuss issues  without interruption or judgement with unconditional positive regard, active listening skills, and empathy. Therapist provided a safe, confidential environment to facilitate the development of a positive therapeutic relationship and encouraged open, honest communication. Assisted Patient in identifying risk factors which would indicate the need for higher level of care including thoughts to harm self or others and/or self-harming behavior and encouraged Patient to contact this office during business hours, call 911, or present to the nearest emergency room should any of these events occur. Discussed crisis intervention services and means to access. Patient adamantly and convincingly denies current suicidal or homicidal ideation or perceptual disturbance. Assisted Patient in processing session content; acknowledged and normalized Patient’s thoughts, feelings, and concerns by utilizing a person-centered approach in efforts to build appropriate rapport and a positive  therapeutic relationship with open and honest communication.     Follow Up:   Return in about 4 weeks (around 11/5/2024) for Therapy.      This document has been electronically signed by Agustina Castrejon LCSW  October 8, 2024 11:58 EDT

## 2024-10-24 PROCEDURE — 87070 CULTURE OTHR SPECIMN AEROBIC: CPT

## 2024-10-24 PROCEDURE — 87186 SC STD MICRODIL/AGAR DIL: CPT

## 2024-10-24 PROCEDURE — 87147 CULTURE TYPE IMMUNOLOGIC: CPT

## 2024-10-24 PROCEDURE — 87205 SMEAR GRAM STAIN: CPT

## 2024-10-25 ENCOUNTER — OFFICE VISIT (OUTPATIENT)
Dept: FAMILY MEDICINE CLINIC | Facility: CLINIC | Age: 29
End: 2024-10-25
Payer: MEDICAID

## 2024-10-25 VITALS
HEIGHT: 67 IN | HEART RATE: 102 BPM | DIASTOLIC BLOOD PRESSURE: 78 MMHG | WEIGHT: 142.8 LBS | OXYGEN SATURATION: 94 % | BODY MASS INDEX: 22.41 KG/M2 | SYSTOLIC BLOOD PRESSURE: 116 MMHG

## 2024-10-25 DIAGNOSIS — L02.211 ABSCESS OF SKIN OF ABDOMEN: Primary | ICD-10-CM

## 2024-10-25 DIAGNOSIS — F41.9 ANXIETY: ICD-10-CM

## 2024-10-25 PROCEDURE — 99214 OFFICE O/P EST MOD 30 MIN: CPT | Performed by: PHYSICIAN ASSISTANT

## 2024-10-25 NOTE — PROGRESS NOTES
Chief Complaint   Patient presents with    Anxiety     Anxiety f/u   Discuss antibiotic cream for wound, would like more border gauze         HPI     Jim Andrade is a 29 y.o. male who is here for follow-up of  left abdominal abscess .     Patient here for follow-up after I&D for left abdominal abscess at [facility] yesterday. He states pain and swelling has gone down. He states there has been some green drainage today. He has not picked up bactrim from his pharmacy.   He has seen behavioral health twice for talk therapy which is going well. He feels his anxiety is well controlled on effexor at this time.     Past Medical History:   Diagnosis Date    ADHD (attention deficit hyperactivity disorder)     Depression     Frequent bowel movements     Gall stones     GERD (gastroesophageal reflux disease)     Kidney stones     Migraine     Pancreatitis        Past Surgical History:   Procedure Laterality Date    HERNIA MESH REMOVAL      INGUINAL HERNIA REPAIR         Family History   Problem Relation Age of Onset    Drug abuse Mother        Social History     Socioeconomic History    Marital status: Single   Tobacco Use    Smoking status: Every Day     Current packs/day: 0.50     Average packs/day: 0.5 packs/day for 19.8 years (9.9 ttl pk-yrs)     Types: Cigarettes     Start date: 1/1/2005    Smokeless tobacco: Never   Vaping Use    Vaping status: Every Day    Start date: 1/1/2018    Substances: Nicotine    Devices: Disposable   Substance and Sexual Activity    Alcohol use: Not Currently    Drug use: Not Currently     Types: Methamphetamines     Comment: 7 mo sober    Sexual activity: Yes     Partners: Male     Birth control/protection: Condom       No Known Allergies    ROS    Review of Systems   Skin:  Positive for wound.   Psychiatric/Behavioral:  The patient is not nervous/anxious.        Vitals:    10/25/24 1304   BP: 116/78   Pulse: 102   SpO2: 94%     Body mass index is 22.36 kg/m².      Current Outpatient  Medications:     Blood Pressure Monitoring (Sphygmomanometer) misc, Use to monitor blood pressure once daily as directed., Disp: 1 each, Rfl: 0    omeprazole (priLOSEC) 40 MG capsule, Take 1 capsule by mouth Daily., Disp: 90 capsule, Rfl: 0    venlafaxine XR (Effexor XR) 75 MG 24 hr capsule, Take 1 capsule by mouth Daily., Disp: 30 capsule, Rfl: 2    sulfamethoxazole-trimethoprim (BACTRIM DS,SEPTRA DS) 800-160 MG per tablet, Take 1 tablet by mouth 2 (Two) Times a Day for 7 days. (Patient not taking: Reported on 10/25/2024), Disp: 14 tablet, Rfl: 0    PE    Physical Exam  Vitals reviewed.   Constitutional:       General: He is not in acute distress.  Pulmonary:      Effort: Pulmonary effort is normal. No respiratory distress.   Abdominal:       Neurological:      Mental Status: He is alert.   Psychiatric:         Mood and Affect: Mood normal.         Results    Results for orders placed or performed during the hospital encounter of 10/24/24   Wound Culture - Wound, Abdominal Wall    Collection Time: 10/24/24  2:15 PM    Specimen: Abdominal Wall; Wound   Result Value Ref Range    Wound Culture Growth present, too young to evaluate     Gram Stain No WBCs or organisms seen     Gram Stain Rare (1+) Epithelial cells per low power field        A/P    Problem List Items Addressed This Visit          Mental Health    Anxiety     Other Visit Diagnoses       Abscess of skin of abdomen    -  Primary          -Patient was counseled on wound care, changing dressings daily, and to  bactrim from his pharmacy.   -Continue effexor and appointments with behavioral health for anxiety.   -RTC in 3 months for follow-up of chronic conditions, prediabetes.       Plan of care was reviewed with patient at the conclusion of today's visit. Education was provided regarding diagnoses, management, and the importance of keeping follow-up appointments. The patient was counseled regarding the risks, benefits, and possible side-effects of  treatment. Patient and/or family express understanding and agreement with the management plan.        Ignacio Bearden PA-C

## 2024-11-07 ENCOUNTER — HOSPITAL ENCOUNTER (EMERGENCY)
Facility: HOSPITAL | Age: 29
Discharge: HOME | End: 2024-11-07
Attending: EMERGENCY MEDICINE
Payer: MEDICAID

## 2024-11-07 VITALS
DIASTOLIC BLOOD PRESSURE: 76 MMHG | SYSTOLIC BLOOD PRESSURE: 103 MMHG | OXYGEN SATURATION: 94 % | HEART RATE: 89 BPM | TEMPERATURE: 98.5 F | RESPIRATION RATE: 22 BRPM

## 2024-11-07 DIAGNOSIS — L02.213 CHEST WALL ABSCESS: Primary | ICD-10-CM

## 2024-11-07 PROCEDURE — 25010000002 LIDOCAINE PF 1% 1 % SOLUTION: Performed by: EMERGENCY MEDICINE

## 2024-11-07 PROCEDURE — 99283 EMERGENCY DEPT VISIT LOW MDM: CPT

## 2024-11-07 RX ORDER — SULFAMETHOXAZOLE AND TRIMETHOPRIM 800; 160 MG/1; MG/1
1 TABLET ORAL 2 TIMES DAILY
Qty: 14 TABLET | Refills: 0 | Status: SHIPPED | OUTPATIENT
Start: 2024-11-07 | End: 2024-11-10

## 2024-11-07 RX ORDER — LIDOCAINE HYDROCHLORIDE 10 MG/ML
5 INJECTION, SOLUTION INFILTRATION; PERINEURAL ONCE
Status: DISCONTINUED | OUTPATIENT
Start: 2024-11-07 | End: 2024-11-07 | Stop reason: HOSPADM

## 2024-11-07 RX ORDER — LIDOCAINE HYDROCHLORIDE 10 MG/ML
10 INJECTION, SOLUTION EPIDURAL; INFILTRATION; INTRACAUDAL; PERINEURAL ONCE
Status: COMPLETED | OUTPATIENT
Start: 2024-11-07 | End: 2024-11-07

## 2024-11-07 RX ORDER — MUPIROCIN CALCIUM 20 MG/G
1 CREAM TOPICAL 3 TIMES DAILY
Qty: 30 G | Refills: 0 | Status: SHIPPED | OUTPATIENT
Start: 2024-11-07 | End: 2024-11-17

## 2024-11-07 RX ADMIN — LIDOCAINE HYDROCHLORIDE 5 ML: 10 INJECTION, SOLUTION EPIDURAL; INFILTRATION; INTRACAUDAL; PERINEURAL at 05:09

## 2024-11-07 NOTE — Clinical Note
[facility] EMERGENCY DEPARTMENT  [address]  [facility] [address]  Phone: [phone]    Jim Andrade was seen and treated in our emergency department on 11/7/2024.  He may return to work on 11/07/2024.         Thank you for choosing [facility].    Daniel Pat MD

## 2024-11-07 NOTE — ED PROVIDER NOTES
Subjective   History of Present Illness  Is a 29-year-old male with past medical history of GERD presenting to the emergency department with a wound on his chest.  Patient states that he has been dealing with some wounds throughout his chest.  He had 1 on his abdomen a few weeks ago.  He was placed on antibiotics and improved.  However the swelling came up over the last few days.  He has a located over the left side of his chest.  It is very red and warm to touch.  He states has been draining some pus as well.  He denies any fevers or chills.  No headache or change in vision.  No focal weakness.  No chest pain or shortness of breath    History provided by:  Patient and EMS personnel   used: No        Review of Systems   Constitutional:  Negative for chills and fever.   HENT:  Negative for congestion, ear pain and sore throat.    Eyes:  Negative for visual disturbance.   Respiratory:  Negative for shortness of breath.    Cardiovascular:  Negative for chest pain.   Gastrointestinal:  Negative for abdominal pain.   Genitourinary:  Negative for difficulty urinating.   Musculoskeletal:  Negative for arthralgias.   Skin:  Positive for wound. Negative for rash.   Neurological:  Negative for dizziness, weakness and numbness.   Psychiatric/Behavioral:  Negative for agitation.        Past Medical History:   Diagnosis Date    ADHD (attention deficit hyperactivity disorder)     Depression     Frequent bowel movements     Gall stones     GERD (gastroesophageal reflux disease)     Kidney stones     Migraine     Pancreatitis        No Known Allergies    Past Surgical History:   Procedure Laterality Date    HERNIA MESH REMOVAL      INGUINAL HERNIA REPAIR         Family History   Problem Relation Age of Onset    Drug abuse Mother        Social History     Socioeconomic History    Marital status: Single   Tobacco Use    Smoking status: Every Day     Current packs/day: 0.50     Average packs/day: 0.5 packs/day for  19.8 years (9.9 ttl pk-yrs)     Types: Cigarettes     Start date: 1/1/2005    Smokeless tobacco: Never   Vaping Use    Vaping status: Every Day    Start date: 1/1/2018    Substances: Nicotine    Devices: Disposable   Substance and Sexual Activity    Alcohol use: Not Currently    Drug use: Not Currently     Types: Methamphetamines     Comment: 7 mo sober    Sexual activity: Yes     Partners: Male     Birth control/protection: Condom           Objective   Physical Exam  Vitals and nursing note reviewed.   Constitutional:       General: He is not in acute distress.     Appearance: He is not ill-appearing or toxic-appearing.   Cardiovascular:      Rate and Rhythm: Normal rate and regular rhythm.   Pulmonary:      Effort: Pulmonary effort is normal. No respiratory distress.   Abdominal:      General: Abdomen is flat. There is no distension.      Palpations: There is no mass.      Tenderness: There is no abdominal tenderness. There is no guarding or rebound.   Musculoskeletal:         General: No deformity. Normal range of motion.   Skin:     Comments: Patient has a 2 cm x 2 cm area of redness on the left upper chest.  Located in the chest wall.  There is a central area of fluctuance.  Tender to palpation.  No crepitus.  No extension of the wound.   Neurological:      General: No focal deficit present.      Mental Status: He is alert.      Sensory: No sensory deficit.      Motor: No weakness.         Incision & Drainage    Date/Time: 11/7/2024 5:12 AM    Performed by: Daniel Pat MD  Authorized by: Daniel Pat MD    Consent:     Consent obtained:  Verbal    Consent given by:  Patient    Risks, benefits, and alternatives were discussed: yes      Risks discussed:  Bleeding, incomplete drainage, infection and damage to other organs    Alternatives discussed:  Delayed treatment  Universal protocol:     Procedure explained and questions answered to patient or proxy's satisfaction: yes      Immediately prior  to procedure, a time out was called: yes      Patient identity confirmed:  Verbally with patient and arm band  Location:     Type:  Abscess    Size:  2 cm    Location:  Trunk    Trunk location:  Chest  Pre-procedure details:     Skin preparation:  Chlorhexidine with alcohol  Sedation:     Sedation type:  None  Anesthesia:     Anesthesia method:  Local infiltration    Local anesthetic:  Lidocaine 1% w/o epi  Procedure type:     Complexity:  Simple  Procedure details:     Ultrasound guidance: no      Needle aspiration: no      Incision types:  Stab incision    Incision depth:  Subcutaneous    Wound management:  Probed and deloculated, irrigated with saline and extensive cleaning    Drainage:  Purulent    Drainage amount:  Moderate    Wound treatment:  Wound left open    Packing materials:  None  Post-procedure details:     Procedure completion:  Tolerated well, no immediate complications             ED Course  ED Course as of 11/07/24 0514   Thu Nov 07, 2024   0512 BP: 103/76 [JK]   0512 Noninvasive MAP (mmHg): 86 [JK]   0512 Temp: 98.5 °F (36.9 °C) [JK]   0512 Heart Rate: 89 [JK]   0512 Resp: 22 [JK]   0512 SpO2: 94 %  Interpretation:  Patient's vitals were directly viewed and interpreted by myself.   O2 sat 94% on room air, interpreted as normal  Telemetry revealed a rate of 89 bpm, interpreted as normal sinus rhythm [JK]   0513 Patient tolerated incision and drainage well.  I gave wound care instructions.  Patient was placed on a short course of antibiotics.  Follow-up with PCP in 48 hours.  Given strict return precautions.  Verbalized understanding. [JK]   0513 I had a discussion with the patient/family regarding diagnosis, diagnostic results, treatment plan, and medications. The patient/family indicated understanding of these instructions. I spent adequate time at the bedside prior to discharge necessary to discuss the aftercare instructions, giving patient education, providing explanations of the results of our  evaluations/findings, and my decision making to assure that the patient/family understand the plan of care. Time was allotted to answer questions at that time and throughout the ED course. Patient is required to maintain timely follow up, as discussed. I also discussed the potential for the development of an acute emergent condition requiring further evaluation, return to the ER, admission, or even surgical intervention.  I encouraged the patient to return to the emergency department immediately for any concerns, worsening symptoms, new complaints, or if symptoms persist and they are unable to seek follow-up in a timely fashion. The patient/family expressed understanding and agreement with this plan    Shared decision making:   After full review of the patient's clinical presentation, review of any work-up including but not limited to laboratory studies and radiology obtained, I had a discussion with the patient.  Treatment options were discussed as well as the risks, benefits and consequences.  I discussed all findings with the patient and family members if available.  During the discussion, treatment goals were understood by all as well as any misconceptions which were addressed with the patient.  Ample time was given for any questions they may have had.  They are in agreement with the treatment plan as well as final disposition. [JK]      ED Course User Index  [JK] Daniel Pat MD                                               Medical Decision Making  This is a 29-year-old male presenting to the emergency department for evaluation of a wound on his chest.  The patient's findings are consistent with an abscess.  Patient will benefit from incision and drainage..      Differential diagnosis: Abscess, cellulitis, folliculitis, contusion, hematoma    Amount and/or Complexity of Data Reviewed  Independent Historian: EMS  External Data Reviewed: notes.     Details: External laboratories, imaging as well as notes  were reviewed personally by myself.  All relevant studies were used to guide decision making.     Date of previous record: 10/24/2024    Source of note: Urgent treatment    Summary: Patient was seen for abscess and cellulitis.  I reviewed records on file      Risk  Prescription drug management.        Final diagnoses:   Chest wall abscess       ED Disposition  ED Disposition       ED Disposition   Discharge    Condition   Stable    Comment   --               Ignacio Bearden PA-C  [address] [place] [id number]  [phone]    Call in 1 day           Medication List        New Prescriptions      mupirocin 2 % cream  Commonly known as: BACTROBAN  Apply 1 Application topically to the appropriate area as directed 3 (Three) Times a Day for 10 days.     sulfamethoxazole-trimethoprim 800-160 MG per tablet  Commonly known as: BACTRIM DS,SEPTRA DS  Take 1 tablet by mouth 2 (Two) Times a Day.               Where to Get Your Medications        These medications were sent to [facility] - [place], [place] - [facility]  - [phone]  - [phone]   [address] [facility] [address]      Phone: [phone]   mupirocin 2 % cream  sulfamethoxazole-trimethoprim 800-160 MG per tablet            Daniel Pat MD  11/07/24 3863

## 2024-11-07 NOTE — Clinical Note
[facility] EMERGENCY DEPARTMENT  [address]  [facility] [address]  Phone: [phone]    Jim Andrade was seen and treated in our emergency department on 11/7/2024.  He may return to work on 11/07/2024.         Thank you for choosing [facility].    Daniel Pat MD

## 2024-11-10 ENCOUNTER — HOSPITAL ENCOUNTER (EMERGENCY)
Facility: HOSPITAL | Age: 29
Discharge: HOME | End: 2024-11-10
Attending: STUDENT IN AN ORGANIZED HEALTH CARE EDUCATION/TRAINING PROGRAM | Admitting: STUDENT IN AN ORGANIZED HEALTH CARE EDUCATION/TRAINING PROGRAM
Payer: MEDICAID

## 2024-11-10 VITALS
WEIGHT: 142.86 LBS | OXYGEN SATURATION: 94 % | TEMPERATURE: 98.4 F | BODY MASS INDEX: 22.42 KG/M2 | RESPIRATION RATE: 16 BRPM | HEART RATE: 80 BPM | DIASTOLIC BLOOD PRESSURE: 68 MMHG | HEIGHT: 67 IN | SYSTOLIC BLOOD PRESSURE: 104 MMHG

## 2024-11-10 DIAGNOSIS — L02.213 CHEST WALL ABSCESS: Primary | ICD-10-CM

## 2024-11-10 DIAGNOSIS — L03.311 ABDOMINAL WALL CELLULITIS: ICD-10-CM

## 2024-11-10 LAB
ALBUMIN SERPL-MCNC: 4.3 G/DL (ref 3.5–5.2)
ALBUMIN/GLOB SERPL: 1.4 G/DL
ALP SERPL-CCNC: 93 U/L (ref 39–117)
ALT SERPL W P-5'-P-CCNC: 22 U/L (ref 1–41)
ANION GAP SERPL CALCULATED.3IONS-SCNC: 8 MMOL/L (ref 5–15)
AST SERPL-CCNC: 31 U/L (ref 1–40)
BASOPHILS # BLD AUTO: 0.03 10*3/MM3 (ref 0–0.2)
BASOPHILS NFR BLD AUTO: 0.3 % (ref 0–1.5)
BILIRUB SERPL-MCNC: 0.2 MG/DL (ref 0–1.2)
BUN SERPL-MCNC: 13 MG/DL (ref 6–20)
BUN/CREAT SERPL: 15.7 (ref 7–25)
CALCIUM SPEC-SCNC: 9 MG/DL (ref 8.6–10.5)
CHLORIDE SERPL-SCNC: 103 MMOL/L (ref 98–107)
CO2 SERPL-SCNC: 27 MMOL/L (ref 22–29)
CREAT SERPL-MCNC: 0.83 MG/DL (ref 0.76–1.27)
DEPRECATED RDW RBC AUTO: 41.3 FL (ref 37–54)
EGFRCR SERPLBLD CKD-EPI 2021: 120 ML/MIN/1.73
EOSINOPHIL # BLD AUTO: 0.22 10*3/MM3 (ref 0–0.4)
EOSINOPHIL NFR BLD AUTO: 2 % (ref 0.3–6.2)
ERYTHROCYTE [DISTWIDTH] IN BLOOD BY AUTOMATED COUNT: 13 % (ref 12.3–15.4)
GLOBULIN UR ELPH-MCNC: 3 GM/DL
GLUCOSE SERPL-MCNC: 128 MG/DL (ref 65–99)
HCT VFR BLD AUTO: 43.7 % (ref 37.5–51)
HGB BLD-MCNC: 14.4 G/DL (ref 13–17.7)
IMM GRANULOCYTES # BLD AUTO: 0.03 10*3/MM3 (ref 0–0.05)
IMM GRANULOCYTES NFR BLD AUTO: 0.3 % (ref 0–0.5)
LYMPHOCYTES # BLD AUTO: 2.36 10*3/MM3 (ref 0.7–3.1)
LYMPHOCYTES NFR BLD AUTO: 21.3 % (ref 19.6–45.3)
MCH RBC QN AUTO: 28.9 PG (ref 26.6–33)
MCHC RBC AUTO-ENTMCNC: 33 G/DL (ref 31.5–35.7)
MCV RBC AUTO: 87.8 FL (ref 79–97)
MONOCYTES # BLD AUTO: 0.87 10*3/MM3 (ref 0.1–0.9)
MONOCYTES NFR BLD AUTO: 7.8 % (ref 5–12)
NEUTROPHILS NFR BLD AUTO: 68.3 % (ref 42.7–76)
NEUTROPHILS NFR BLD AUTO: 7.58 10*3/MM3 (ref 1.7–7)
NRBC BLD AUTO-RTO: 0 /100 WBC (ref 0–0.2)
PLATELET # BLD AUTO: 348 10*3/MM3 (ref 140–450)
PMV BLD AUTO: 8.5 FL (ref 6–12)
POTASSIUM SERPL-SCNC: 4 MMOL/L (ref 3.5–5.2)
PROT SERPL-MCNC: 7.3 G/DL (ref 6–8.5)
RBC # BLD AUTO: 4.98 10*6/MM3 (ref 4.14–5.8)
SODIUM SERPL-SCNC: 138 MMOL/L (ref 136–145)
WBC NRBC COR # BLD AUTO: 11.09 10*3/MM3 (ref 3.4–10.8)

## 2024-11-10 PROCEDURE — 36415 COLL VENOUS BLD VENIPUNCTURE: CPT

## 2024-11-10 PROCEDURE — 85025 COMPLETE CBC W/AUTO DIFF WBC: CPT | Performed by: NURSE PRACTITIONER

## 2024-11-10 PROCEDURE — 99283 EMERGENCY DEPT VISIT LOW MDM: CPT

## 2024-11-10 PROCEDURE — 80053 COMPREHEN METABOLIC PANEL: CPT | Performed by: NURSE PRACTITIONER

## 2024-11-10 RX ORDER — DOXYCYCLINE 100 MG/1
100 CAPSULE ORAL ONCE
Status: DISCONTINUED | OUTPATIENT
Start: 2024-11-10 | End: 2024-11-10

## 2024-11-10 RX ORDER — SULFAMETHOXAZOLE AND TRIMETHOPRIM 800; 160 MG/1; MG/1
1 TABLET ORAL ONCE
Status: COMPLETED | OUTPATIENT
Start: 2024-11-10 | End: 2024-11-10

## 2024-11-10 RX ORDER — SULFAMETHOXAZOLE AND TRIMETHOPRIM 800; 160 MG/1; MG/1
2 TABLET ORAL 2 TIMES DAILY
Qty: 40 TABLET | Refills: 0 | Status: SHIPPED | OUTPATIENT
Start: 2024-11-10

## 2024-11-10 RX ADMIN — SULFAMETHOXAZOLE AND TRIMETHOPRIM 1 TABLET: 800; 160 TABLET ORAL at 14:25

## 2024-11-10 NOTE — ED PROVIDER NOTES
Subjective   History of Present Illness  Patient presents by EMS today for his bandages stuck to the wound on his chest and he cannot get it off.  He has his chest covered in lotion which has been helpful to try to remove some of the bandages.  He could not get it off completely as it was very tender so he came into the ER.  Seen here recently and had an incision and drainage of his chest.  He is on 1 pill of Bactrim twice a day.  He denies any fevers chills chest pain difficulty breathing.  He also has a small area in his abdomen that did leak open previously.  Multiple history of abscess in the past.  Wound culture shows susceptibility to Bactrim with Staph aureus.        Review of Systems    Past Medical History:   Diagnosis Date    ADHD (attention deficit hyperactivity disorder)     Depression     Frequent bowel movements     Gall stones     GERD (gastroesophageal reflux disease)     Kidney stones     Migraine     Pancreatitis        No Known Allergies    Past Surgical History:   Procedure Laterality Date    HERNIA MESH REMOVAL      INGUINAL HERNIA REPAIR         Family History   Problem Relation Age of Onset    Drug abuse Mother        Social History     Socioeconomic History    Marital status: Single   Tobacco Use    Smoking status: Every Day     Current packs/day: 0.50     Average packs/day: 0.5 packs/day for 19.9 years (9.9 ttl pk-yrs)     Types: Cigarettes     Start date: 1/1/2005    Smokeless tobacco: Never   Vaping Use    Vaping status: Every Day    Start date: 1/1/2018    Substances: Nicotine    Devices: Disposable   Substance and Sexual Activity    Alcohol use: Not Currently    Drug use: Not Currently     Types: Methamphetamines     Comment: 7 mo sober    Sexual activity: Yes     Partners: Male     Birth control/protection: Condom           Objective   Physical Exam  Constitutional:       Appearance: He is well-developed.   HENT:      Head: Normocephalic and atraumatic.      Right Ear: External ear  normal.      Left Ear: External ear normal.      Nose: Nose normal.   Eyes:      Conjunctiva/sclera: Conjunctivae normal.      Pupils: Pupils are equal, round, and reactive to light.   Cardiovascular:      Rate and Rhythm: Normal rate and regular rhythm.      Heart sounds: Normal heart sounds.   Pulmonary:      Effort: Pulmonary effort is normal.      Breath sounds: Normal breath sounds.   Abdominal:      General: Bowel sounds are normal.      Palpations: Abdomen is soft.   Musculoskeletal:         General: Normal range of motion.      Cervical back: Normal range of motion and neck supple.   Skin:     General: Skin is warm and dry.      Comments: Healing drained abscess to his chest with white lotion around his chest.  Scant if any cellulitis.  Mildly tender.  Does have another area of cellulitis with a papule that would most likely benefit from drainage however the patient refuses tells me he thinks it is healing up appropriately.   Neurological:      Mental Status: He is alert and oriented to person, place, and time.   Psychiatric:         Behavior: Behavior normal.         Judgment: Judgment normal.         Procedures           ED Course  ED Course as of 11/10/24 1439   Sun Nov 10, 2024   1346 Differential includes cellulitis.  Abscess.  He refuses to have me drain the area on his abdomen.  He tells me his had these before he does not think it needs to be cut open.  I did advise him that they could heal better if we did do an incision and drainage.  He tells me it is very soft and he does not think he needs it.  He would like to try antibiotics first.  He has been putting lotion on the wound on his upper chest.  Advised him to stop doing that we will give him nonadherent dressings as well.  That area appears to be draining appropriately.  Wound cultures reviewed.  We will either increase his dose of Bactrim to 2 pills twice a day as he has not been on it very long or changed on doxycycline. [JM]   9697 Patient  has not been on Bactrim for very long.  We will increase his dose to the standard 2 pills twice a day.  Will get lab work to check his kidney functions as well.  Patient verbalized understanding. [JM]      ED Course User Index  [JM] Darryl Alonso APRN                    No data recorded                             Medical Decision Making  Problems Addressed:  Abdominal wall cellulitis: complicated acute illness or injury  Chest wall abscess: complicated acute illness or injury    Amount and/or Complexity of Data Reviewed  Labs: ordered.    Risk  Prescription drug management.        Final diagnoses:   Chest wall abscess   Abdominal wall cellulitis       ED Disposition  ED Disposition       ED Disposition   Discharge    Condition   Stable    Comment   --               Ignacio Bearden PA-C  [address] [place] [id number] [phone]    Schedule an appointment as soon as possible for a visit            Medication List        Changed      sulfamethoxazole-trimethoprim 800-160 MG per tablet  Commonly known as: BACTRIM DS,SEPTRA DS  Take 2 tablets by mouth 2 (Two) Times a Day.  What changed: how much to take               Where to Get Your Medications        These medications were sent to [facility] - [place], [place] - [address] [facility] [phone] [facility] [phone]   [address] [facility] [address]      Phone: [phone]   sulfamethoxazole-trimethoprim 800-160 MG per tablet            Darryl Alonso APRN  11/10/24 8121

## 2024-11-13 ENCOUNTER — OFFICE VISIT (OUTPATIENT)
Age: 29
End: 2024-11-13
Payer: MEDICAID

## 2024-11-13 DIAGNOSIS — F41.9 ANXIETY: Primary | ICD-10-CM

## 2024-11-13 PROCEDURE — 1159F MED LIST DOCD IN RCRD: CPT

## 2024-11-13 PROCEDURE — 90832 PSYTX W PT 30 MINUTES: CPT

## 2024-11-13 PROCEDURE — 1160F RVW MEDS BY RX/DR IN RCRD: CPT

## 2024-11-13 NOTE — PROGRESS NOTES
Adult Follow Up       Date Encounter: 2024   Name: Jim Andrade  MRN: [id number]  : 1995    Time In: 11:55  Time Out: 12:30     Referring Provider: Ignacio Bearden PA-C    Chief Complaint:      ICD-10-CM ICD-9-CM   1. Anxiety  F41.9 300.00        History of Present Illness:   Jim Andrade is a 29 y.o. male who is being seen today for follow up behavioral health therapy visit to continue to understand patient's mental health needs and to continue discussion about therapy goals using psychotherapy and evidence based tools in attempt to reduce symptoms of anxiety.  Patient was informed that therapist was leaving the practice and this session would be his final until he can transfer to another therapist.  Patient was provided with a list of [facility] and [facility] mental health resources, but he opted for continuing with this practice and new therapist that will arrive in 2024.  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  Patient reported his sleep has been an adjustment, but now he feels rested after sleeping during the day.  Patient also reported his recovery continues successfully and has been attending meetings regularly.  Patient was calm and pleasant during therapy session and was actively engaged in discussion.      Subjective     Screening Scores:       SUICIDE RISK ASSESSMENT/CSSRS  Does patient have thoughts of suicide? no  Does patient have a plan for suicide? no    Medications:     Current Outpatient Medications:     Blood Pressure Monitoring (Sphygmomanometer) misc, Use to monitor blood pressure once daily as directed., Disp: 1 each, Rfl: 0    mupirocin (BACTROBAN) 2 % cream, Apply 1 Application topically to the appropriate area as directed 3 (Three) Times a Day for 10 days., Disp: 30 g, Rfl: 0    omeprazole (priLOSEC) 40 MG capsule, Take 1  capsule by mouth Daily., Disp: 90 capsule, Rfl: 0    sulfamethoxazole-trimethoprim (BACTRIM DS,SEPTRA DS) 800-160 MG per tablet, Take 2 tablets by mouth 2 (Two) Times a Day., Disp: 40 tablet, Rfl: 0    venlafaxine XR (Effexor XR) 75 MG 24 hr capsule, Take 1 capsule by mouth Daily., Disp: 30 capsule, Rfl: 2    Allergies:   No Known Allergies     Objective       Mental Status Exam:   MENTAL STATUS EXAM   General Appearance:  Cleanly groomed and dressed  Eye Contact:  Good eye contact  Attitude:  Cooperative and polite  Motor Activity:  Normal gait, posture  Muscle Strength:  Normal  Speech:  Normal rate, tone, volume  Language:  Spontaneous  Mood and affect:  Normal, pleasant and appropriate  Hopelessness:  2  Loneliness: 5  Thought Process:  Logical, goal-directed and linear  Associations/ Thought Content:  No delusions  Hallucinations:  Other  Other Comment:  Vivid dreams  Suicidal Ideations:  Not present  Homicidal Ideation:  Not present  Sensorium:  Alert and clear  Orientation:  Person, place, time and situation  Immediate Recall, Recent, and Remote Memory:  Intact  Attention Span/ Concentration:  Good and selective attention  Fund of Knowledge:  Appropriate for age and educational level  Intellectual Functioning:  Average range  Insight:  Good  Judgement:  Good  Reliability:  Good  Impulse Control:  Good      Assessment / Plan      Visit Diagnosis/Orders Placed This Visit:    ICD-10-CM ICD-9-CM   1. Anxiety  F41.9 300.00        PLAN:    Progress toward goal: Not at goal    Prognosis: Fair with ongoing treatment    Safety: No acute safety concerns.  Therapist will continue to monitor.    Risk Assessment: Risk of self-harm acutely is low. Risk of self-harm chronically is also low, but could be further elevated in the event of treatment noncompliance and/or substance abuse.     Treatment Plan/Goals: Continue supportive psychotherapy efforts and medications as indicated. Treatment and medication options discussed  during today's visit. Patient acknowledged and verbally consented to continue with current treatment plan and was educated on the importance of compliance with treatment and follow-up appointments. Patient seems reasonably determined to adhere to treatment plan.      Assisted Patient in processing above session content; acknowledged and normalized patient’s thoughts, feelings, and concerns.  Rationalized patient thought process regarding continuing to do activities that bring mic and continued socialization regularly.  Patient reported his goals are to purchase a vehicle potentially through [employer], continue working, and to pay his bills regularly and to decrease food insecurity.  Patient has been attending food pantries when needed to prevent food insecurity, but is worried about his glucose levels.  Therapist encouraged patient to talk to his primary care provider and provided him a brochure on healthy lifestyle and understanding lab results.  Patient reported recently attempted dating an individual where he recognized some unhealthy behaviors and he set boundaries toward this person that protects himself and also attempt to decrease levels of anxiety.  Patient was also encouraged to continue reviewing coping skills for anxiety worksheet and using deep breathing, mindfulness, and Socratic questioning to analyze his thoughts and provide relaxation strategies.  Patient verbalized understanding to having a final therapy visit for now and did not have any questions or concerns at this time.  Patient was encouraged to call this clinic if needed.    Allowed Patient to freely discuss issues  without interruption or judgement with unconditional positive regard, active listening skills, and empathy. Therapist provided a safe, confidential environment to facilitate the development of a positive therapeutic relationship and encouraged open, honest communication. Assisted Patient in identifying risk factors which would  indicate the need for higher level of care including thoughts to harm self or others and/or self-harming behavior and encouraged Patient to contact this office during business hours, call 911, or present to the nearest emergency room should any of these events occur. Discussed crisis intervention services and means to access. Patient adamantly and convincingly denies current suicidal or homicidal ideation or perceptual disturbance. Assisted Patient in processing session content; acknowledged and normalized Patient’s thoughts, feelings, and concerns by utilizing a person-centered approach in efforts to build appropriate rapport and a positive therapeutic relationship with open and honest communication.     Follow Up:   No follow-ups on file.      This document has been electronically signed by Agustina Castrejon LCSW  November 13, 2024 13:40 EST

## 2025-01-14 ENCOUNTER — APPOINTMENT (OUTPATIENT)
Dept: GENERAL RADIOLOGY | Facility: HOSPITAL | Age: 30
End: 2025-01-14
Payer: MEDICAID

## 2025-01-14 ENCOUNTER — HOSPITAL ENCOUNTER (EMERGENCY)
Facility: HOSPITAL | Age: 30
Discharge: HOME | End: 2025-01-14
Attending: EMERGENCY MEDICINE | Admitting: EMERGENCY MEDICINE
Payer: MEDICAID

## 2025-01-14 VITALS
DIASTOLIC BLOOD PRESSURE: 78 MMHG | BODY MASS INDEX: 21.69 KG/M2 | HEIGHT: 66 IN | SYSTOLIC BLOOD PRESSURE: 110 MMHG | TEMPERATURE: 99.5 F | RESPIRATION RATE: 18 BRPM | HEART RATE: 108 BPM | WEIGHT: 135 LBS | OXYGEN SATURATION: 95 %

## 2025-01-14 DIAGNOSIS — J10.1 INFLUENZA A: ICD-10-CM

## 2025-01-14 DIAGNOSIS — R91.1 LUNG NODULE: ICD-10-CM

## 2025-01-14 DIAGNOSIS — J18.9 PNEUMONIA OF LEFT LOWER LOBE DUE TO INFECTIOUS ORGANISM: Primary | ICD-10-CM

## 2025-01-14 LAB
FLUAV RNA RESP QL NAA+PROBE: DETECTED
FLUBV RNA RESP QL NAA+PROBE: NOT DETECTED
QT INTERVAL: 304 MS
QTC INTERVAL: 403 MS
SARS-COV-2 RNA RESP QL NAA+PROBE: NOT DETECTED

## 2025-01-14 PROCEDURE — 99284 EMERGENCY DEPT VISIT MOD MDM: CPT

## 2025-01-14 PROCEDURE — 93005 ELECTROCARDIOGRAM TRACING: CPT | Performed by: EMERGENCY MEDICINE

## 2025-01-14 PROCEDURE — 87636 SARSCOV2 & INF A&B AMP PRB: CPT | Performed by: PHYSICIAN ASSISTANT

## 2025-01-14 PROCEDURE — 71045 X-RAY EXAM CHEST 1 VIEW: CPT

## 2025-01-14 RX ORDER — DOXYCYCLINE 100 MG/1
100 CAPSULE ORAL 2 TIMES DAILY
Qty: 20 CAPSULE | Refills: 0 | Status: SHIPPED | OUTPATIENT
Start: 2025-01-14 | End: 2025-01-24

## 2025-01-14 RX ORDER — IBUPROFEN 800 MG/1
800 TABLET, FILM COATED ORAL ONCE
Status: COMPLETED | OUTPATIENT
Start: 2025-01-14 | End: 2025-01-14

## 2025-01-14 RX ORDER — OSELTAMIVIR PHOSPHATE 75 MG/1
75 CAPSULE ORAL 2 TIMES DAILY
Qty: 10 CAPSULE | Refills: 0 | Status: SHIPPED | OUTPATIENT
Start: 2025-01-14 | End: 2025-01-19

## 2025-01-14 RX ORDER — ALBUTEROL SULFATE 90 UG/1
2 INHALANT RESPIRATORY (INHALATION) EVERY 6 HOURS PRN
Qty: 3.7 G | Refills: 0 | Status: SHIPPED | OUTPATIENT
Start: 2025-01-14

## 2025-01-14 RX ADMIN — IBUPROFEN 800 MG: 800 TABLET, FILM COATED ORAL at 11:09

## 2025-01-14 NOTE — Clinical Note
[facility] EMERGENCY DEPARTMENT  [address]  [facility] [address]  Phone: [phone]    Jim Andrade was seen and treated in our emergency department on 1/14/2025.  He may return to work on 01/17/2025.         Thank you for choosing [facility].    Jolanta Guevara, PA

## 2025-01-14 NOTE — ED PROVIDER NOTES
Subjective   History of Present Illness  Pt is a 30 yo male presenting to ED with complaints of cough. PMHx significant for Kidney stones, Pancreatitis, Gallstones, GERD, Migraines, ADHD and Depression. Pt complains of cough, chills and congestion for 2 days. He has had subjective fevers. He denies headache, sore throat, SOB, CP, V/D or abdominal pain. Pt denies specific sick contacts but living in a group home. He denies recent antibiotics. He has been in recovery for 15 months.     History provided by:  Patient and medical records      Review of Systems   Constitutional:  Positive for chills, fatigue and fever.   HENT:  Positive for congestion. Negative for sore throat and trouble swallowing.    Eyes:  Negative for visual disturbance.   Respiratory:  Positive for cough. Negative for shortness of breath.    Cardiovascular:  Negative for chest pain.   Gastrointestinal:  Negative for abdominal pain, diarrhea and vomiting.   Musculoskeletal:  Positive for myalgias. Negative for arthralgias, back pain and neck pain.   Neurological:  Negative for dizziness, syncope, weakness and headaches.       Past Medical History:   Diagnosis Date    ADHD (attention deficit hyperactivity disorder)     Depression     Frequent bowel movements     Gall stones     GERD (gastroesophageal reflux disease)     Kidney stones     Migraine     Pancreatitis        No Known Allergies    Past Surgical History:   Procedure Laterality Date    HERNIA MESH REMOVAL      INGUINAL HERNIA REPAIR         Family History   Problem Relation Age of Onset    Drug abuse Mother        Social History     Socioeconomic History    Marital status: Single   Tobacco Use    Smoking status: Every Day     Current packs/day: 0.50     Average packs/day: 0.5 packs/day for 20.0 years (10.0 ttl pk-yrs)     Types: Cigarettes     Start date: 1/1/2005    Smokeless tobacco: Never   Vaping Use    Vaping status: Every Day    Start date: 1/1/2018    Substances: Nicotine    Devices:  Disposable   Substance and Sexual Activity    Alcohol use: Not Currently    Drug use: Not Currently     Types: Methamphetamines     Comment: 7 mo sober    Sexual activity: Yes     Partners: Male     Birth control/protection: Condom           Objective   Physical Exam  Vitals and nursing note reviewed.   Constitutional:       Appearance: He is well-developed.   HENT:      Head: Atraumatic.      Nose: Congestion present.   Eyes:      General: Lids are normal.      Conjunctiva/sclera: Conjunctivae normal.      Pupils: Pupils are equal, round, and reactive to light.   Cardiovascular:      Rate and Rhythm: Regular rhythm. Tachycardia present.      Heart sounds: Normal heart sounds.   Pulmonary:      Effort: Pulmonary effort is normal.      Breath sounds: Normal breath sounds.   Abdominal:      General: There is no distension.      Palpations: Abdomen is soft.      Tenderness: There is no abdominal tenderness. There is no guarding or rebound.   Musculoskeletal:         General: No tenderness or deformity. Normal range of motion.      Cervical back: Normal range of motion and neck supple.   Skin:     General: Skin is warm and dry.   Neurological:      Mental Status: He is alert and oriented to person, place, and time.      Sensory: No sensory deficit.   Psychiatric:         Behavior: Behavior normal.         Procedures           ED Course  ED Course as of 01/14/25 1114   e Jan 14, 2025   1020 I personally reviewed and interpreted labs results and went over with patient. I personally reviewed and interpreted vitals. [RT]   1022 Heart Rate(!): 121  Noted tachycardia and will monitor.  [RT]   1051 EKG personally reviewed and interpreted with sinus tach at 106 and no acute ischemic changes.  [RT]   1056 Temp: 99.5 °F (37.5 °C)  Noted temp. Ordered Motrin [RT]   1056 I personally and independently reviewed CXR and agree with the radiology interpretation specifically left lower lobe pneumonia and left sided nodule.  [RT]  "  1102 Influenza A PCR(!): Detected [RT]   1111 Heart Rate: 108  HR improved.  [RT]   1111 SpO2: 95 %  RA [RT]   1111 Discussed results and tx plan with patient. He is agreeable and will f/u with PCP. Discussed lung nodule and outpatient f/u. [RT]      ED Course User Index  [RT] Jolanta Guevara, PA      Recent Results (from the past 24 hours)   ECG 12 Lead Other; COUGH    Collection Time: 01/14/25 10:20 AM   Result Value Ref Range    QT Interval 304 ms    QTC Interval 403 ms   COVID-19 and FLU A/B PCR, 1 HR TAT - Swab, Nasopharynx    Collection Time: 01/14/25 10:22 AM    Specimen: Nasopharynx; Swab   Result Value Ref Range    COVID19 Not Detected Not Detected - Ref. Range    Influenza A PCR Detected (A) Not Detected    Influenza B PCR Not Detected Not Detected     Note: In addition to lab results from this visit, the labs listed above may include labs taken at another facility or during a different encounter within the last 24 hours. Please correlate lab times with ED admission and discharge times for further clarification of the services performed during this visit.    XR Chest 1 View   Final Result   Impression:   1.Possible left lower lobe pneumonia.   2.Nodular density projected in the left lung base. Recommend follow-up chest CT.            Electronically Signed: Darryl Hunt MD     1/14/2025 10:51 AM EST     Workstation ID: [id number]        Vitals:    01/14/25 1007 01/14/25 1103 01/14/25 1104   BP: 110/78     Pulse: (!) 121 107 108   Resp: 18     Temp: 99.5 °F (37.5 °C)     TempSrc: Oral     SpO2: 96% 94% 95%   Weight: 61.2 kg (135 lb)     Height: 167.6 cm (66\")       Medications   ibuprofen (ADVIL,MOTRIN) tablet 800 mg (800 mg Oral Given 1/14/25 1109)     ECG/EMG Results (last 24 hours)       Procedure Component Value Units Date/Time    ECG 12 Lead Other; COUGH [[id number]] Collected: 01/14/25 1020     Updated: 01/14/25 1051     QT Interval 304 ms      QTC Interval 403 ms     Narrative:      Test Reason : " Other~  Blood Pressure :   */*   mmHG  Vent. Rate : 106 BPM     Atrial Rate : 106 BPM     P-R Int : 130 ms          QRS Dur :  70 ms      QT Int : 304 ms       P-R-T Axes :  60  16  31 degrees    QTcB Int : 403 ms    Sinus tachycardia  Otherwise normal ECG  When compared with ECG of 13-Jun-2024 03:36,  Vent. rate has increased by  45 bpm  Confirmed by MD Mcgregor Michael ([id number]) on 1/14/2025 10:51:35 AM    Referred By: NAVEED           Confirmed By: Chauncey Mcgregor MD          ECG 12 Lead Other; COUGH   Final Result   Test Reason : Other~   Blood Pressure :   */*   mmHG   Vent. Rate : 106 BPM     Atrial Rate : 106 BPM      P-R Int : 130 ms          QRS Dur :  70 ms       QT Int : 304 ms       P-R-T Axes :  60  16  31 degrees     QTcB Int : 403 ms      Sinus tachycardia   Otherwise normal ECG   When compared with ECG of 13-Jun-2024 03:36,   Vent. rate has increased by  45 bpm   Confirmed by MD Mcgregor Michael ([id number]) on 1/14/2025 10:51:35 AM      Referred By: NAVEED           Confirmed By: Chauncey Mcgregor MD          DISCHARGE    Patient discharged in stable condition.    Reviewed implications of results, diagnosis, meds, responsibility to follow up, warning signs and symptoms of possible worsening, potential complications and reasons to return to ER.    Patient/Family voiced understanding of above instructions.    Discussed plan for discharge, as there is no emergent indication for admission.  Pt/family is agreeable and understands need for follow up and possible repeat testing.  Pt/family is aware that discharge does not mean that nothing is wrong but that it indicates no emergency is currently present that requires admission and they must continue care with follow-up as given below or with a physician of their choice.     FOLLOW-UP  Ignacio Bearden PA-C  [address] [facility] [address] [phone]    Schedule an appointment as soon as possible for a visit       [facility] EMERGENCY DEPARTMENT  [address]  [facility] [address] [phone]    If symptoms worsen         Medication List        New Prescriptions      albuterol sulfate  (90 Base) MCG/ACT inhaler  Commonly known as: PROVENTIL HFA;VENTOLIN HFA;PROAIR HFA  Inhale 2 puffs Every 6 (Six) Hours As Needed for Wheezing.     doxycycline 100 MG capsule  Commonly known as: MONODOX  Take 1 capsule by mouth 2 (Two) Times a Day for 10 days.     oseltamivir 75 MG capsule  Commonly known as: TAMIFLU  Take 1 capsule by mouth 2 (Two) Times a Day for 5 days.               Where to Get Your Medications        These medications were sent to [facility] - [place], [place] - [address] [facility] - [phone]  - [phone] [address] [address]      Phone: [phone]   albuterol sulfate  (90 Base) MCG/ACT inhaler  doxycycline 100 MG capsule  oseltamivir 75 MG capsule                                                        Medical Decision Making  Pt is a 28 yo male presenting to ED with complaints of cough and body aches. I had a discussion with the patient / family regarding ED course, diagnosis, diagnostic results and treatment plan including medications and admission / discharge. Discussed if new or worse symptoms / concerns to return to ED for further evaluation. Discussed need for close follow up with PCP / specialists.     DDx  Covid, Flu, Pneumonia, Resp failure, Sepsis, Lung cancer    Problems Addressed:  Influenza A: complicated acute illness or injury  Lung nodule: complicated acute illness or injury  Pneumonia of left lower lobe due to infectious organism: complicated acute illness or injury    Amount and/or Complexity of Data Reviewed  External Data Reviewed: notes.     Details: Reviewed previous non ED visits including prior labs, imaging, available notes, medications, allergies and surgical hx.   11-13-24 Behavioral health for anxiety   Labs:  Decision-making details documented in ED  Course.  Radiology: ordered and independent interpretation performed. Decision-making details documented in ED Course.  ECG/medicine tests: ordered. Decision-making details documented in ED Course.    Risk  Prescription drug management.        Final diagnoses:   Pneumonia of left lower lobe due to infectious organism   Influenza A   Lung nodule       ED Disposition  ED Disposition       ED Disposition   Discharge    Condition   Stable    Comment   --               Ignacio Bearden PA-C  [address] [facility] [address]  [phone]    Schedule an appointment as soon as possible for a visit       [facility] EMERGENCY DEPARTMENT  [address] [facility] [address] [phone]    If symptoms worsen         Medication List        New Prescriptions      albuterol sulfate  (90 Base) MCG/ACT inhaler  Commonly known as: PROVENTIL HFA;VENTOLIN HFA;PROAIR HFA  Inhale 2 puffs Every 6 (Six) Hours As Needed for Wheezing.     doxycycline 100 MG capsule  Commonly known as: MONODOX  Take 1 capsule by mouth 2 (Two) Times a Day for 10 days.     oseltamivir 75 MG capsule  Commonly known as: TAMIFLU  Take 1 capsule by mouth 2 (Two) Times a Day for 5 days.               Where to Get Your Medications        These medications were sent to [facility] - [place], [place] - [address] [place] [address] - [phone]  - [phone] [address] [address]      Phone: [phone]   albuterol sulfate  (90 Base) MCG/ACT inhaler  doxycycline 100 MG capsule  oseltamivir 75 MG capsule            Jolanta Guevara PA  01/14/25 9580

## 2025-01-20 ENCOUNTER — LAB (OUTPATIENT)
Dept: LAB | Facility: HOSPITAL | Age: 30
End: 2025-01-20
Payer: MEDICAID

## 2025-01-20 ENCOUNTER — OFFICE VISIT (OUTPATIENT)
Dept: FAMILY MEDICINE CLINIC | Facility: CLINIC | Age: 30
End: 2025-01-20
Payer: MEDICAID

## 2025-01-20 VITALS
BODY MASS INDEX: 22.63 KG/M2 | OXYGEN SATURATION: 95 % | HEIGHT: 66 IN | WEIGHT: 140.8 LBS | HEART RATE: 92 BPM | DIASTOLIC BLOOD PRESSURE: 64 MMHG | SYSTOLIC BLOOD PRESSURE: 100 MMHG

## 2025-01-20 DIAGNOSIS — R73.03 PREDIABETES: ICD-10-CM

## 2025-01-20 DIAGNOSIS — J18.9 PNEUMONIA OF LEFT LOWER LOBE DUE TO INFECTIOUS ORGANISM: Primary | ICD-10-CM

## 2025-01-20 DIAGNOSIS — R91.1 LUNG NODULE: ICD-10-CM

## 2025-01-20 DIAGNOSIS — F17.200 TOBACCO DEPENDENCE: ICD-10-CM

## 2025-01-20 LAB — HBA1C MFR BLD: 6.4 % (ref 4.8–5.6)

## 2025-01-20 PROCEDURE — 99214 OFFICE O/P EST MOD 30 MIN: CPT | Performed by: PHYSICIAN ASSISTANT

## 2025-01-20 PROCEDURE — 83036 HEMOGLOBIN GLYCOSYLATED A1C: CPT

## 2025-01-20 RX ORDER — MUPIROCIN 20 MG/G
OINTMENT TOPICAL
COMMUNITY
Start: 2024-11-07

## 2025-01-20 RX ORDER — GUAIFENESIN 600 MG/1
600 TABLET, EXTENDED RELEASE ORAL 2 TIMES DAILY
Qty: 60 TABLET | Refills: 0 | Status: SHIPPED | OUTPATIENT
Start: 2025-01-20

## 2025-01-20 NOTE — PROGRESS NOTES
"    Chief Complaint   Patient presents with    Prediabetes     3 mo f/u for prediabetes and chronic conditions   Was at the hospital last month (pneumonia)          HPI     Jim Andrade is a pleasant 29 y.o. male who presents for evaluation of \"chief complaint.\"     The patient was treated with doxycycline and tamiflu for LLL peumonia and influenza at [facility] ER on 1/14/25. His chest x-ray in the ER showed a nodular density projected in the left lung base. He reports his cough and other symptoms are improving the he can still produce yellow-green sputum intermittently. He feels pressure in the left anterior lower chest which has been intermittent since he was around age 13. He still has some tamiflu due to missing doses. He denies shortness of breath, wheezing, fever, and chills. He is currently smoking 1 ppd.     Past Medical History:   Diagnosis Date    ADHD (attention deficit hyperactivity disorder)     Depression     Frequent bowel movements     Gall stones     GERD (gastroesophageal reflux disease)     Kidney stones     Migraine     Pancreatitis     Pneumonia        Past Surgical History:   Procedure Laterality Date    HERNIA MESH REMOVAL      INGUINAL HERNIA REPAIR         Family History   Problem Relation Age of Onset    Drug abuse Mother        Social History     Socioeconomic History    Marital status: Single   Tobacco Use    Smoking status: Every Day     Current packs/day: 0.50     Average packs/day: 0.5 packs/day for 20.1 years (10.0 ttl pk-yrs)     Types: Cigarettes     Start date: 1/1/2005    Smokeless tobacco: Never   Vaping Use    Vaping status: Every Day    Start date: 1/1/2018    Substances: Nicotine    Devices: Disposable   Substance and Sexual Activity    Alcohol use: Not Currently    Drug use: Not Currently     Types: Methamphetamines     Comment: 7 mo sober    Sexual activity: Yes     Partners: Male     Birth control/protection: Condom       No Known Allergies    ROS    Review of Systems "   Constitutional:  Negative for appetite change, chills, diaphoresis, fever and unexpected weight loss.   HENT:  Positive for congestion.    Respiratory:  Positive for cough. Negative for shortness of breath and wheezing.    Cardiovascular:  Positive for chest pain.   Endocrine: Negative for polydipsia and polyuria.       Vitals:    01/20/25 1019   BP: 100/64   Pulse: 92   SpO2: 95%     Body mass index is 22.74 kg/m².      Current Outpatient Medications:     albuterol sulfate  (90 Base) MCG/ACT inhaler, Inhale 2 puffs Every 6 (Six) Hours As Needed for Wheezing., Disp: 3.7 g, Rfl: 0    Blood Pressure Monitoring (Sphygmomanometer) misc, Use to monitor blood pressure once daily as directed., Disp: 1 each, Rfl: 0    doxycycline (MONODOX) 100 MG capsule, Take 1 capsule by mouth 2 (Two) Times a Day for 10 days., Disp: 20 capsule, Rfl: 0    mupirocin (BACTROBAN) 2 % ointment, , Disp: , Rfl:     omeprazole (priLOSEC) 40 MG capsule, Take 1 capsule by mouth Daily., Disp: 90 capsule, Rfl: 0    sulfamethoxazole-trimethoprim (BACTRIM DS,SEPTRA DS) 800-160 MG per tablet, Take 2 tablets by mouth 2 (Two) Times a Day., Disp: 40 tablet, Rfl: 0    guaiFENesin (Mucinex) 600 MG 12 hr tablet, Take 1 tablet by mouth 2 (Two) Times a Day., Disp: 60 tablet, Rfl: 0    venlafaxine XR (Effexor XR) 75 MG 24 hr capsule, Take 1 capsule by mouth Daily. (Patient not taking: Reported on 1/20/2025), Disp: 30 capsule, Rfl: 2    PE    Physical Exam  Vitals reviewed.          A/P    Problem List Items Addressed This Visit          Endocrine and Metabolic    Prediabetes    Relevant Orders    Hemoglobin A1c (Completed)     Other Visit Diagnoses       Pneumonia of left lower lobe due to infectious organism    -  Primary    Relevant Medications    guaiFENesin (Mucinex) 600 MG 12 hr tablet    Tobacco dependence        Lung nodule        Relevant Medications    guaiFENesin (Mucinex) 600 MG 12 hr tablet    Other Relevant Orders    CT Chest With Contrast           -Clinically improving course. Complete tamiflu and doxycycline. Add mucinex for congestion.   -LLL nodule may be related to his pneumonia/infection. Plan for CT of the chest in 1 month for follow-up.   -Counseled the patient to quit smoking. He declines cessation assistance at this time.   -Patient was advised to notify the office or be reassessed with worsening symptoms.   -Check A1c to follow-up prediabetes today.   -RTC in 6 months for annual physical, sooner prn.     Plan of care was reviewed with patient at the conclusion of today's visit. Education was provided regarding diagnoses, management, prescribed or recommended OTC products, and the importance of compliance with follow-up appointments. The patient was counseled regarding the risks, benefits, and possible side-effects of treatment. I advised the patient to keep me informed of any acute changes in their status including new, worsening, or persistent symptoms. Patient expresses understanding and agreement with the management plan.        Ignacio Bearden PA-C

## 2025-01-30 ENCOUNTER — PATIENT MESSAGE (OUTPATIENT)
Dept: FAMILY MEDICINE CLINIC | Facility: CLINIC | Age: 30
End: 2025-01-30
Payer: MEDICAID

## 2025-01-30 DIAGNOSIS — R73.03 PREDIABETES: Primary | ICD-10-CM

## 2025-03-06 ENCOUNTER — HOSPITAL ENCOUNTER (OUTPATIENT)
Facility: HOSPITAL | Age: 30
Discharge: HOME | End: 2025-03-06
Admitting: PHYSICIAN ASSISTANT
Payer: MEDICAID

## 2025-03-06 DIAGNOSIS — R91.1 LUNG NODULE: ICD-10-CM

## 2025-03-06 PROCEDURE — 25510000001 IOPAMIDOL 61 % SOLUTION: Performed by: PHYSICIAN ASSISTANT

## 2025-03-06 PROCEDURE — 71260 CT THORAX DX C+: CPT

## 2025-03-06 RX ORDER — IOPAMIDOL 612 MG/ML
100 INJECTION, SOLUTION INTRAVASCULAR
Status: COMPLETED | OUTPATIENT
Start: 2025-03-06 | End: 2025-03-06

## 2025-03-06 RX ADMIN — IOPAMIDOL 75 ML: 612 INJECTION, SOLUTION INTRAVENOUS at 14:27

## 2025-03-12 ENCOUNTER — READMISSION MANAGEMENT (OUTPATIENT)
Dept: CALL CENTER | Facility: HOSPITAL | Age: 30
End: 2025-03-12
Payer: MEDICAID

## 2025-03-12 NOTE — OUTREACH NOTE
Prep Survey      Flowsheet Row Responses   [facility] facility patient discharged from? Non-BH   Is LACE score < 7 ? Non-BH Discharge   Eligibility [facility]   Date of Admission 03/08/25   Date of Discharge 03/12/25   Discharge Disposition Home or Self Care   Discharge diagnosis Gallstone pancreatitis (Primary Dx),  Cholangitis,  Cholecystitis, acute  [Lap anju on 3/10/25]   Does the patient have one of the following disease processes/diagnoses(primary or secondary)? General Surgery   Does the patient have Home health ordered? No   Is there a DME ordered? No   Prep survey completed? Yes            Reyna LARIOS - Registered Nurse              Reyna LARIOS - Registered Nurse

## 2025-03-13 ENCOUNTER — TRANSITIONAL CARE MANAGEMENT TELEPHONE ENCOUNTER (OUTPATIENT)
Dept: CALL CENTER | Facility: HOSPITAL | Age: 30
End: 2025-03-13
Payer: MEDICAID

## 2025-03-13 NOTE — OUTREACH NOTE
Call Center TCM Note      Flowsheet Row Responses   [facility] patient discharged from? Non-  [[facility]]   Does the patient have one of the following disease processes/diagnoses(primary or secondary)? General Surgery   TCM attempt successful? Yes   Call start time 1201   Call end time 1213   Discharge diagnosis Gallstone pancreatitis (Primary Dx),  Cholangitis,  Cholecystitis, acute   Meds reviewed with patient/caregiver? Yes   Is the patient having any side effects they believe may be caused by any medication additions or changes? No   Does the patient have all medications ordered at discharge? Yes   Is the patient taking all medications as directed (includes completed medication regime)? Yes   Comments Hospital d/c f/u appt on 3/19/25 @3:30pm   Does the patient have an appointment with their PCP within 7-14 days of discharge? Yes   Has home health visited the patient within 72 hours of discharge? N/A   Psychosocial issues? No   Did the patient receive a copy of their discharge instructions? Yes   What is the patient's perception of their health status since discharge? Improving   Is the patient/caregiver able to teach back the hierarchy of who to call/visit for symptoms/problems? PCP, Specialist, Home health nurse, Urgent Care, ED, 911 Yes   TCM call completed? Yes   Call end time 1213   Would this patient benefit from a Referral to Amb Social Work? No   Is the patient interested in additional calls from an ambulatory ? No            Rosemarie Darnell RN    3/13/2025, 12:13 EDT

## 2025-03-21 ENCOUNTER — TELEPHONE (OUTPATIENT)
Dept: FAMILY MEDICINE CLINIC | Facility: CLINIC | Age: 30
End: 2025-03-21

## 2025-03-21 NOTE — TELEPHONE ENCOUNTER
Caller: Jim Andrade    Relationship: Self    Best call back number: [phone]    What was the call regarding: PATIENT WANTS YOVANY TO KNOW THAT HE HAD HIS GALLBLADDER REMOVED ON 3/10/25 AT [facility] AND IS CURRENTLY IN THE ER AT [facility] BECAUSE HE LIFTED A COUCH AND PULLED SOMETHING BY HIS GALLBLADDER FROM IT    Is it okay if the provider responds through MyChart: NO

## 2025-07-21 ENCOUNTER — OFFICE VISIT (OUTPATIENT)
Dept: FAMILY MEDICINE CLINIC | Facility: CLINIC | Age: 30
End: 2025-07-21
Payer: MEDICAID

## 2025-07-21 ENCOUNTER — LAB (OUTPATIENT)
Dept: LAB | Facility: HOSPITAL | Age: 30
End: 2025-07-21
Payer: MEDICAID

## 2025-07-21 VITALS
WEIGHT: 143.6 LBS | BODY MASS INDEX: 23.08 KG/M2 | DIASTOLIC BLOOD PRESSURE: 70 MMHG | SYSTOLIC BLOOD PRESSURE: 110 MMHG | HEIGHT: 66 IN | HEART RATE: 78 BPM | OXYGEN SATURATION: 98 %

## 2025-07-21 DIAGNOSIS — K76.0 HEPATIC STEATOSIS: ICD-10-CM

## 2025-07-21 DIAGNOSIS — E78.2 MIXED HYPERLIPIDEMIA: ICD-10-CM

## 2025-07-21 DIAGNOSIS — R10.10 UPPER ABDOMINAL PAIN: ICD-10-CM

## 2025-07-21 DIAGNOSIS — R73.03 PREDIABETES: ICD-10-CM

## 2025-07-21 DIAGNOSIS — Z00.00 HEALTHCARE MAINTENANCE: Primary | ICD-10-CM

## 2025-07-21 DIAGNOSIS — F17.210 CIGARETTE NICOTINE DEPENDENCE WITHOUT COMPLICATION: ICD-10-CM

## 2025-07-21 DIAGNOSIS — Z00.00 HEALTHCARE MAINTENANCE: ICD-10-CM

## 2025-07-21 LAB
25(OH)D3 SERPL-MCNC: 22.5 NG/ML (ref 30–100)
HBA1C MFR BLD: 6.1 % (ref 4.8–5.6)
HBV SURFACE AB SER RIA-ACNC: REACTIVE
HBV SURFACE AG SERPL QL IA: NORMAL
HCV AB SER QL: NORMAL
HIV 1+2 AB+HIV1 P24 AG SERPL QL IA: NORMAL
RPR SER QL: NORMAL
TSH SERPL DL<=0.05 MIU/L-ACNC: 1.32 UIU/ML (ref 0.27–4.2)

## 2025-07-21 PROCEDURE — 90677 PCV20 VACCINE IM: CPT | Performed by: PHYSICIAN ASSISTANT

## 2025-07-21 PROCEDURE — 83036 HEMOGLOBIN GLYCOSYLATED A1C: CPT

## 2025-07-21 PROCEDURE — 87491 CHLMYD TRACH DNA AMP PROBE: CPT

## 2025-07-21 PROCEDURE — 84443 ASSAY THYROID STIM HORMONE: CPT

## 2025-07-21 PROCEDURE — 86592 SYPHILIS TEST NON-TREP QUAL: CPT

## 2025-07-21 PROCEDURE — 90471 IMMUNIZATION ADMIN: CPT | Performed by: PHYSICIAN ASSISTANT

## 2025-07-21 PROCEDURE — 86706 HEP B SURFACE ANTIBODY: CPT

## 2025-07-21 PROCEDURE — 82306 VITAMIN D 25 HYDROXY: CPT

## 2025-07-21 PROCEDURE — 87340 HEPATITIS B SURFACE AG IA: CPT

## 2025-07-21 PROCEDURE — 87661 TRICHOMONAS VAGINALIS AMPLIF: CPT

## 2025-07-21 PROCEDURE — 2014F MENTAL STATUS ASSESS: CPT | Performed by: PHYSICIAN ASSISTANT

## 2025-07-21 PROCEDURE — 87591 N.GONORRHOEAE DNA AMP PROB: CPT

## 2025-07-21 PROCEDURE — 99395 PREV VISIT EST AGE 18-39: CPT | Performed by: PHYSICIAN ASSISTANT

## 2025-07-21 PROCEDURE — G0432 EIA HIV-1/HIV-2 SCREEN: HCPCS

## 2025-07-21 PROCEDURE — 86803 HEPATITIS C AB TEST: CPT

## 2025-07-21 RX ORDER — OMEPRAZOLE 40 MG/1
40 CAPSULE, DELAYED RELEASE ORAL DAILY
Qty: 90 CAPSULE | Refills: 0 | Status: SHIPPED | OUTPATIENT
Start: 2025-07-21

## 2025-07-21 RX ORDER — ACETAMINOPHEN 325 MG/1
325 TABLET ORAL
COMMUNITY
Start: 2025-03-12

## 2025-07-21 RX ORDER — OXYCODONE HYDROCHLORIDE 5 MG/1
5 TABLET ORAL
COMMUNITY
Start: 2025-03-11

## 2025-07-21 NOTE — LETTER
[facility]  Vaccine Consent Form    Patient Name:  Jim Andrade  Patient :  1995     Vaccine(s) Ordered    Pneumococcal Conjugate Vaccine 20-Valent All        Screening Checklist  The following questions should be completed prior to vaccination. If you answer “yes” to any question, it does not necessarily mean you should not be vaccinated. It just means we may need to clarify or ask more questions. If a question is unclear, please ask your healthcare provider to explain it.    Yes No   Any fever or moderate to severe illness today (mild illness and/or antibiotic treatment are not contraindications)?     Do you have a history of a serious reaction to any previous vaccinations, such as anaphylaxis, encephalopathy within 7 days, Guillain-[place] syndrome within 6 weeks, seizure?     Have you received any live vaccine(s) (e.g MMR, ASIM) or any other vaccines in the last month (to ensure duplicate doses aren't given)?     Do you have an anaphylactic allergy to latex (DTaP, DTaP-IPV, Hep A, Hep B, MenB, RV, Td, Tdap), baker’s yeast (Hep B, HPV), polysorbates (RSV, nirsevimab, PCV 20 and 21, Rotavirrus, Tdap, Shingrix), or gelatin (ASIM, MMR)?     Do you have an anaphylactic allergy to neomycin (Rabies, ASIM, MMR, IPV, Hep A), polymyxin B (IPV), or streptomycin (IPV)?      Any cancer, leukemia, AIDS, or other immune system disorder? (ASIM, MMR, RV)     Do you have a parent, brother, or sister with an immune system problem (if immune competence of vaccine recipient clinically verified, can proceed)? (MMR, ASIM)     Any recent steroid treatments for >2 weeks, chemotherapy, or radiation treatment? (ASIM, MMR)     Have you received antibody-containing blood transfusions or IVIG in the past 11 months (recommended interval is dependent on product)? (MMR, ASIM)     Have you taken antiviral drugs (acyclovir, famciclovir, valacyclovir for ASIM) in the last 24 or 48 hours, respectively?      Are you pregnant or planning to  "become pregnant within 1 month? (ASIM, MMR, HPV, IPV, MenB, Abrexvy; For Hep B- refer to Engerix-B; For RSV - Abrysvo is indicated for 32-36 weeks of pregnancy from September to January)     For infants, have you ever been told your child has had intussusception or a medical emergency involving obstruction of the intestine (Rotavirus)? If not for an infant, can skip this question.         *Ordering Physicians/APC should be consulted if \"yes\" is checked by the patient or guardian above.  I have received, read, and understand the Vaccine Information Statement (VIS) for each vaccine ordered.  I have considered my or my child's health status as well as the health status of my close contacts.  I have taken the opportunity to discuss my vaccine questions with my or my child's health care provider.   I have requested that the ordered vaccine(s) be given to me or my child.  I understand the benefits and risks of the vaccines.  I understand that I should remain in the clinic for 15 minutes after receiving the vaccine(s).  _________________________________________________________  Signature of Patient or Parent/Legal Guardian ____________________  Date   "

## 2025-07-21 NOTE — PROGRESS NOTES
Reason for visit    Jim Andrade is a 30 y.o. male who presents for annual comprehensive exam.    Chief Complaint   Patient presents with    Annual Exam       HPI     Here for physical.     He still has epigastric pain with eating. No vomiting. He takes omeprazole only as needed which seems to help. Tries to avoid fatty foods since his cholecystectomy.   Requesting STI screening because he has a new HIV positive roommate. He denies new sexual partners or exposures.   He walks to work about 1 mile daily.   Overdue for dental exam. Current with eye exam.   1/2 ppd cigarette use. Not ready to quit at this time.     Past Medical History:   Diagnosis Date    ADHD (attention deficit hyperactivity disorder)     Depression     Frequent bowel movements     Gall stones     GERD (gastroesophageal reflux disease)     Kidney stones     Migraine     Pancreatitis     Pneumonia        Past Surgical History:   Procedure Laterality Date    CHOLECYSTECTOMY      HERNIA MESH REMOVAL      INGUINAL HERNIA REPAIR         Family History   Problem Relation Age of Onset    Drug abuse Mother        Social History     Socioeconomic History    Marital status: Single   Tobacco Use    Smoking status: Every Day     Current packs/day: 0.50     Average packs/day: 0.5 packs/day for 20.6 years (10.3 ttl pk-yrs)     Types: Cigarettes     Start date: 1/1/2005    Smokeless tobacco: Never   Vaping Use    Vaping status: Every Day    Start date: 1/1/2018    Substances: Nicotine    Devices: Disposable   Substance and Sexual Activity    Alcohol use: Not Currently    Drug use: Not Currently     Types: Methamphetamines     Comment: 7 mo sober    Sexual activity: Yes     Partners: Male     Birth control/protection: Condom       No Known Allergies    ROS    Review of Systems   Constitutional:  Positive for diaphoresis. Negative for chills, fatigue, fever and unexpected weight loss.   HENT:  Negative for congestion, hearing loss, sore throat, swollen glands  and trouble swallowing.    Eyes:  Positive for blurred vision (gradual changes). Negative for visual disturbance.   Respiratory:  Negative for cough and shortness of breath.    Cardiovascular:  Negative for chest pain.   Gastrointestinal:  Positive for abdominal pain and GERD. Negative for blood in stool, constipation and diarrhea.   Endocrine: Negative for polydipsia.   Genitourinary:  Negative for difficulty urinating, dysuria, hematuria, scrotal swelling and testicular pain.   Musculoskeletal:  Negative for arthralgias and gait problem.   Skin:  Positive for rash.   Neurological:  Negative for dizziness, numbness and headache.   Hematological:  Negative for adenopathy.   Psychiatric/Behavioral:  Negative for sleep disturbance and depressed mood. The patient is not nervous/anxious.        Vitals:    07/21/25 0848   BP: 110/70   Pulse: 78   SpO2: 98%     Body mass index is 23.19 kg/m².      Current Outpatient Medications:     acetaminophen (TYLENOL) 325 MG tablet, Take 1 tablet by mouth., Disp: , Rfl:     omeprazole (priLOSEC) 40 MG capsule, Take 1 capsule by mouth Daily., Disp: 90 capsule, Rfl: 0    oxyCODONE (ROXICODONE) 5 MG immediate release tablet, Take 1 tablet by mouth., Disp: , Rfl:     PE    Physical Exam  Vitals reviewed.   Constitutional:       General: He is not in acute distress.     Appearance: He is well-developed.   HENT:      Head: Normocephalic and atraumatic.      Right Ear: Hearing and tympanic membrane normal.      Left Ear: Hearing and tympanic membrane normal.      Mouth/Throat:      Mouth: Mucous membranes are moist.      Dentition: Normal dentition.      Pharynx: Oropharynx is clear.   Eyes:      Extraocular Movements: Extraocular movements intact.      Conjunctiva/sclera: Conjunctivae normal.      Pupils: Pupils are equal, round, and reactive to light.      Comments:      Neck:      Thyroid: No thyroid mass or thyromegaly.      Vascular: No carotid bruit.   Cardiovascular:      Rate and  Rhythm: Normal rate and regular rhythm.      Heart sounds: No murmur heard.     No friction rub.   Pulmonary:      Effort: Pulmonary effort is normal.      Breath sounds: Normal breath sounds.   Abdominal:      General: Bowel sounds are normal. There is no abdominal bruit.      Palpations: Abdomen is soft. There is no mass.      Tenderness: There is abdominal tenderness in the epigastric area. There is no guarding or rebound.   Musculoskeletal:         General: Normal range of motion.      Cervical back: Normal range of motion and neck supple.   Lymphadenopathy:      Cervical: No cervical adenopathy.      Upper Body:      Right upper body: No supraclavicular adenopathy.      Left upper body: No supraclavicular adenopathy.   Skin:     General: Skin is warm and dry.      Findings: No rash.      Nails: There is no clubbing.      Comments: No suspicious nevi on clothed exam.    Neurological:      Mental Status: He is alert.      Gait: Gait normal.      Deep Tendon Reflexes: Reflexes normal.   Psychiatric:         Speech: Speech normal.         Behavior: Behavior normal.         A/P    Problem List Items Addressed This Visit          Cardiac and Vasculature    Mixed hyperlipidemia       Endocrine and Metabolic    Prediabetes       Gastrointestinal Abdominal     Hepatic steatosis       Tobacco    Cigarette nicotine dependence without complication     Other Visit Diagnoses         Healthcare maintenance    -  Primary    Relevant Orders    TSH Rfx On Abnormal To Free T4    Vitamin D,25-Hydroxy    Hemoglobin A1c    Chlamydia trachomatis, Neisseria gonorrhoeae, PCR - Urine, Urine, Clean Catch    RPR Qualitative with Reflex to Quant    HIV-1 / O / 2 Ag / Antibody    Hepatitis C Antibody    Hepatitis B Surface Antibody    Hepatitis B Surface Antigen      Upper abdominal pain        Relevant Orders    Ambulatory Referral to Gastroenterology          -Counseled patient regarding cancer screening, immunizations, healthy lifestyle,  diet, maintaining a healthy weight, and exercise.   -Prevnar 20 given in office after discussion. Discussed 2nd dose monkeypox vaccine through the health department.   -Annual dental and eye exams were encouraged.   -Check labs as ordered.   -In this visit the patient was advised to stop smoking and was offered tobacco cessation measures and resources, including NRT and/or medication intervention. At least 3 minutes was spent on face-to-face counseling regarding smoking cessation. He declines to quit at this time.   -Refer to GI for continued epigastric pain. Counseled patient to begin taking PPI daily.   -Discussed low fat diet, exercise for fatty liver.    -RTC in 1 year for routine physical, sooner prn.     Plan of care was reviewed with patient at the conclusion of today's visit. Education was provided regarding diagnoses, management, treatment plan, and the importance of keeping follow-up appointments. The patient was counseled regarding the risks, benefits, and possible side-effects of treatment. Patient and/or family expresses understanding and agreement with the management plan.        Ignacio Bearden PA-C

## 2025-07-22 LAB
C TRACH DNA SPEC QL NAA+PROBE: NOT DETECTED
C TRACH RRNA SPEC QL NAA+PROBE: NEGATIVE
N GONORRHOEA RRNA SPEC QL NAA+PROBE: NEGATIVE
N GONORRHOEA RRNA SPEC QL NAA+PROBE: NOT DETECTED
T VAGINALIS RRNA SPEC QL NAA+PROBE: NEGATIVE

## 2025-07-22 RX ORDER — OXYCODONE HYDROCHLORIDE 5 MG/1
5 TABLET ORAL
OUTPATIENT
Start: 2025-07-22

## 2025-07-22 NOTE — TELEPHONE ENCOUNTER
Rx Refill Note  Requested Prescriptions     Pending Prescriptions Disp Refills    oxyCODONE (ROXICODONE) 5 MG immediate release tablet       Sig: Take 1 tablet by mouth.      Last office visit with prescribing clinician: 7/21/2025   Last telemedicine visit with prescribing clinician: Visit date not found   Next office visit with prescribing clinician: 7/22/2026                         Would you like a call back once the refill request has been completed: [] Yes [] No    If the office needs to give you a call back, can they leave a voicemail: [] Yes [] No    Josette White MA  07/22/25, 09:22 EDT

## 2025-07-25 ENCOUNTER — RESULTS FOLLOW-UP (OUTPATIENT)
Dept: FAMILY MEDICINE CLINIC | Facility: CLINIC | Age: 30
End: 2025-07-25
Payer: MEDICAID

## 2025-07-25 RX ORDER — CHOLECALCIFEROL (VITAMIN D3) 25 MCG
1000 TABLET ORAL DAILY
Qty: 90 TABLET | Refills: 3 | Status: SHIPPED | OUTPATIENT
Start: 2025-07-25